# Patient Record
Sex: FEMALE | Race: BLACK OR AFRICAN AMERICAN | NOT HISPANIC OR LATINO | Employment: UNEMPLOYED | URBAN - METROPOLITAN AREA
[De-identification: names, ages, dates, MRNs, and addresses within clinical notes are randomized per-mention and may not be internally consistent; named-entity substitution may affect disease eponyms.]

---

## 2017-07-21 ENCOUNTER — HOSPITAL ENCOUNTER (EMERGENCY)
Facility: HOSPITAL | Age: 37
Discharge: HOME | End: 2017-07-21
Attending: EMERGENCY MEDICINE
Payer: MEDICAID

## 2017-07-21 VITALS
WEIGHT: 180 LBS | BODY MASS INDEX: 33.99 KG/M2 | TEMPERATURE: 98 F | HEART RATE: 77 BPM | HEIGHT: 61 IN | SYSTOLIC BLOOD PRESSURE: 138 MMHG | RESPIRATION RATE: 16 BRPM | DIASTOLIC BLOOD PRESSURE: 89 MMHG | OXYGEN SATURATION: 100 %

## 2017-07-21 DIAGNOSIS — R51.9 NONINTRACTABLE EPISODIC HEADACHE, UNSPECIFIED HEADACHE TYPE: Primary | ICD-10-CM

## 2017-07-21 LAB
B-HCG UR QL: NEGATIVE
CTP QC/QA: YES
DEPRECATED S PYO AG THROAT QL EIA: NEGATIVE

## 2017-07-21 PROCEDURE — 87880 STREP A ASSAY W/OPTIC: CPT

## 2017-07-21 PROCEDURE — 81025 URINE PREGNANCY TEST: CPT | Performed by: EMERGENCY MEDICINE

## 2017-07-21 PROCEDURE — 87081 CULTURE SCREEN ONLY: CPT

## 2017-07-21 PROCEDURE — 99283 EMERGENCY DEPT VISIT LOW MDM: CPT

## 2017-07-21 RX ORDER — BUTALBITAL, ACETAMINOPHEN AND CAFFEINE 50; 325; 40 MG/1; MG/1; MG/1
1 TABLET ORAL EVERY 6 HOURS PRN
Qty: 12 TABLET | Refills: 0 | Status: SHIPPED | OUTPATIENT
Start: 2017-07-21 | End: 2017-08-20

## 2017-07-21 NOTE — DISCHARGE INSTRUCTIONS
You have been given a medication that may cause drowsiness (fioricet), do not drive or operate heavy machinery with this medication. Return to the Emergency department for any worsening or failure to improve, otherwise follow up with your primary care provider.

## 2017-07-21 NOTE — ED TRIAGE NOTES
"Pt reports bumps on her tongue, "was black when I was brushing my teeth this morning", no pain, felt dizzy with HA and double vision  "

## 2017-07-21 NOTE — ED PROVIDER NOTES
Encounter Date: 2017       History     Chief Complaint   Patient presents with    Headache     Occipital headache X 4 days with ocassional dizziness. Also c/o bumps on tongue.     Chief complaint headache  History of present illness: Patient states that 4 days ago she began having occipital headache that is intermittent and throbbing.  She states that nothing alleviates it although she hasn't tried any medications or therapies.  She states that bright lights and loud noises make the headache worse she reports that she is not under an undue amount of stress.  She states the headache does not radiate and timing of the day does not matter to the headache and at present the pain as an 8/10.  Patient also reports that this morning she was brushing her teeth and when she expectorated there were black specks in the expectorant she examined her tongue and found that it was coated in black.  She denies recent antibiotic use.    The history is provided by the patient. No  was used.     Review of patient's allergies indicates:   Allergen Reactions    Ciprofloxacin Hives     Past Medical History:   Diagnosis Date    Calcium deficiency      Past Surgical History:   Procedure Laterality Date    ABDOMINAL SURGERY       SECTION, CLASSIC      x 1     Family History   Problem Relation Age of Onset    Diabetes Father     Hypertension Father     Kidney disease Father     No Known Problems Mother     Heart disease Maternal Grandmother     Diabetes Maternal Grandmother     Diabetes Paternal Grandmother     Heart disease Paternal Grandmother      Social History   Substance Use Topics    Smoking status: Never Smoker    Smokeless tobacco: Never Used    Alcohol use No     Review of Systems   Constitutional: Negative for chills, fatigue and fever.   HENT: Negative for congestion, ear discharge, ear pain, postnasal drip, rhinorrhea, sinus pressure, sneezing, sore throat and voice change.          Black bumps on tongue   Eyes: Negative for discharge and itching.   Respiratory: Negative for cough, shortness of breath and wheezing.    Cardiovascular: Negative for chest pain, palpitations and leg swelling.   Gastrointestinal: Negative for abdominal pain, constipation, diarrhea, nausea and vomiting.   Endocrine: Negative for polydipsia, polyphagia and polyuria.   Genitourinary: Negative for dysuria, frequency, hematuria, urgency, vaginal bleeding, vaginal discharge and vaginal pain.   Musculoskeletal: Negative for arthralgias and myalgias.   Skin: Negative for rash and wound.   Neurological: Positive for headaches. Negative for dizziness, seizures, syncope, weakness and numbness.   Hematological: Negative for adenopathy. Does not bruise/bleed easily.   Psychiatric/Behavioral: Negative for self-injury and suicidal ideas. The patient is not nervous/anxious.        Physical Exam     Initial Vitals [07/21/17 1211]   BP Pulse Resp Temp SpO2   138/89 77 16 98.2 °F (36.8 °C) 100 %      MAP       105.33         Physical Exam    Nursing note and vitals reviewed.  Constitutional: She appears well-developed and well-nourished.   HENT:   Head: Normocephalic and atraumatic.   Right Ear: Hearing, external ear and ear canal normal. Tympanic membrane is erythematous.   Left Ear: Hearing, tympanic membrane, external ear and ear canal normal.   Nose: Nose normal. No epistaxis.   Mouth/Throat: Uvula is midline. Posterior oropharyngeal edema and posterior oropharyngeal erythema present.   Eyes: Conjunctivae and EOM are normal. Pupils are equal, round, and reactive to light. Right eye exhibits no discharge. Left eye exhibits no discharge.   Neck: Normal range of motion.   Cardiovascular: Regular rhythm, S1 normal, S2 normal and normal heart sounds. Exam reveals no gallop.    No murmur heard.  Pulmonary/Chest: Effort normal and breath sounds normal. No respiratory distress. She has no decreased breath sounds. She has no wheezes. She  has no rhonchi. She has no rales.   Abdominal: She exhibits no distension.   Musculoskeletal: Normal range of motion.   Lymphadenopathy:        Head (right side): Submandibular adenopathy present.        Head (left side): Submandibular adenopathy present.   Neurological: She is alert and oriented to person, place, and time. She has normal strength. No cranial nerve deficit or sensory deficit. She exhibits normal muscle tone. She displays a negative Romberg sign. Coordination and gait normal. GCS eye subscore is 4. GCS verbal subscore is 5. GCS motor subscore is 6.   Equal  strength bilaterally, equal bicep flexion and tricep extension strength, leg extension and flexion strength appropriate and equal, foot plantar- and dorsi-flexion equal and appropriate   Skin: Skin is dry. Capillary refill takes less than 2 seconds.         ED Course   Procedures  Labs Reviewed   THROAT SCREEN, RAPID   POCT URINE PREGNANCY             Medical Decision Making:   Initial Assessment:   37-year-old female presents to the emergency department for emergent evaluation of occipital headache as well as black spots on her tongue  Differential Diagnosis:   Tension headache, migraine headache, cluster headache  ED Management:  Following a thorough history and physical, the patient was discharged with Fioricet and urged to follow-up with her primary care provider if this medication therapy does not give complete relief of her symptoms.  A rapid strep test was done in the emergency department and found to be negative a reflex strep swab was sent to the laboratory if the reflex strep is positive she'll be contacted if antibiotics will be prescribed.  Other:   I have discussed this case with another health care provider.       <> Summary of the Discussion: Care of the patient discussed with Dr. Banuelos who agreed with the assessment and plan.                       ED Course     Clinical Impression:   The encounter diagnosis was Nonintractable  episodic headache, unspecified headache type.    Disposition:   Disposition: Discharged  Condition: Stable                        Brandon Moser, [facility]  07/21/17 4037

## 2017-07-23 LAB — BACTERIA THROAT CULT: NORMAL

## 2018-09-12 ENCOUNTER — HOSPITAL ENCOUNTER (EMERGENCY)
Facility: HOSPITAL | Age: 38
Discharge: HOME | End: 2018-09-12
Attending: EMERGENCY MEDICINE
Payer: MEDICAID

## 2018-09-12 VITALS
BODY MASS INDEX: 43.43 KG/M2 | OXYGEN SATURATION: 100 % | WEIGHT: 230 LBS | TEMPERATURE: 98 F | RESPIRATION RATE: 20 BRPM | SYSTOLIC BLOOD PRESSURE: 132 MMHG | HEART RATE: 87 BPM | HEIGHT: 61 IN | DIASTOLIC BLOOD PRESSURE: 78 MMHG

## 2018-09-12 DIAGNOSIS — S39.012A STRAIN OF LUMBAR REGION, INITIAL ENCOUNTER: Primary | ICD-10-CM

## 2018-09-12 DIAGNOSIS — Z34.90 PREGNANCY, UNSPECIFIED GESTATIONAL AGE: ICD-10-CM

## 2018-09-12 LAB
BILIRUB UR QL STRIP: NEGATIVE
CLARITY UR: CLEAR
COLOR UR: YELLOW
GLUCOSE UR QL STRIP: NEGATIVE
HGB UR QL STRIP: NEGATIVE
KETONES UR QL STRIP: NEGATIVE
LEUKOCYTE ESTERASE UR QL STRIP: NEGATIVE
NITRITE UR QL STRIP: NEGATIVE
PH UR STRIP: 7 [PH] (ref 5–8)
PROT UR QL STRIP: NEGATIVE
SP GR UR STRIP: 1.02 (ref 1–1.03)
URN SPEC COLLECT METH UR: NORMAL
UROBILINOGEN UR STRIP-ACNC: NEGATIVE EU/DL

## 2018-09-12 PROCEDURE — 99283 EMERGENCY DEPT VISIT LOW MDM: CPT

## 2018-09-12 PROCEDURE — 87086 URINE CULTURE/COLONY COUNT: CPT

## 2018-09-12 PROCEDURE — 81003 URINALYSIS AUTO W/O SCOPE: CPT

## 2018-09-12 RX ORDER — DEXTROMETHORPHAN HYDROBROMIDE, GUAIFENESIN 5; 100 MG/5ML; MG/5ML
650 LIQUID ORAL EVERY 8 HOURS PRN
Qty: 20 TABLET | Refills: 0 | Status: SHIPPED | OUTPATIENT
Start: 2018-09-12 | End: 2023-08-25

## 2018-09-12 RX ORDER — OXYCODONE AND ACETAMINOPHEN 5; 325 MG/1; MG/1
1 TABLET ORAL
Status: DISCONTINUED | OUTPATIENT
Start: 2018-09-12 | End: 2018-09-12 | Stop reason: HOSPADM

## 2018-09-12 NOTE — ED TRIAGE NOTES
"Pt currently 18 weeks pregnant (and had cervical cerclage placed on the 31st). Pt c/o lower back pain since this AM. Describes as "someone twisting a fist in my back", pain rated 10/10. Denies abdominal pain, N/V, dysuria. Pt took Tylenol 5 hours PTA  "

## 2018-09-12 NOTE — ED PROVIDER NOTES
Encounter Date: 2018    SCRIBE #1 NOTE: I, Gurwinder Davisy II, am scribing for, and in the presence of,  Bhupendra Plummer NP. I have scribed the following portions of the note - Other sections scribed: HPI, ROS.       History     Chief Complaint   Patient presents with    Back Pain     Pt states she is 19 weeks pregnant and has been experiencing lower back pain since she woke up this morning.     CC: Back Pain     HPI: This 38 y.o. Gravid (17 weeks) female presents to the ED c/o acute onset, lumbar back pain x10 hours. Pt reports she woke up with back pain and reports taking Tylenol 500 mg in the morning and at noon. Pt reports her pregnancy is being followed by Brianda Cr. No alleviating factors. Pain is exacerbated with palpation. Pt denies nausea, vomiting, weakness, abdominal pain, and numbness.    PMHx: Calcium Deficiency      The history is provided by the patient. No  was used.     Review of patient's allergies indicates:   Allergen Reactions    Ciprofloxacin Hives     Past Medical History:   Diagnosis Date    Calcium deficiency      Past Surgical History:   Procedure Laterality Date    ABDOMINAL SURGERY      APPENDECTOMY      APPENDECTOMY-LAPAROSCOPIC N/A 2015    Performed by [facility]rell [facility] at [facility] OR     SECTION, CLASSIC      x 1    [id number] N/A 2015    Performed by Cordell Terrell MD at [facility] OR     Family History   Problem Relation Age of Onset    Diabetes Father     Hypertension Father     Kidney disease Father     No Known Problems Mother     Heart disease Maternal Grandmother     Diabetes Maternal Grandmother     Diabetes Paternal Grandmother     Heart disease Paternal Grandmother      Social History     Tobacco Use    Smoking status: Never Smoker    Smokeless tobacco: Never Used   Substance Use Topics    Alcohol use: No    Drug use: No     Review of Systems   Constitutional: Negative for chills, fatigue and fever.    HENT: Negative for congestion, ear pain, nosebleeds, postnasal drip, rhinorrhea, sinus pressure and sore throat.    Eyes: Negative for photophobia and pain.   Respiratory: Negative for apnea, cough, choking, chest tightness, shortness of breath, wheezing and stridor.    Cardiovascular: Negative for chest pain, palpitations and leg swelling.   Gastrointestinal: Negative for abdominal pain, constipation, diarrhea, nausea and vomiting.   Genitourinary: Negative for dysuria, flank pain, hematuria, pelvic pain and vaginal discharge.   Musculoskeletal: Positive for back pain. Negative for arthralgias, gait problem and neck pain.   Skin: Negative for color change, pallor, rash and wound.   Neurological: Negative for dizziness, seizures, syncope, facial asymmetry, light-headedness and headaches.   Hematological: Negative for adenopathy.   Psychiatric/Behavioral: Negative for confusion. The patient is not nervous/anxious.        Physical Exam     Initial Vitals [09/12/18 1818]   BP Pulse Resp Temp SpO2   132/75 86 16 98.6 °F (37 °C) 99 %      MAP       --         Physical Exam    Nursing note and vitals reviewed.  Constitutional: She appears well-developed and well-nourished. She is not diaphoretic. No distress.   HENT:   Head: Normocephalic and atraumatic.   Right Ear: External ear normal.   Left Ear: External ear normal.   Nose: Nose normal.   Eyes: Conjunctivae and EOM are normal. Pupils are equal, round, and reactive to light. Right eye exhibits no discharge. Left eye exhibits no discharge. No scleral icterus.   Neck: Normal range of motion. Neck supple. No thyromegaly present. No tracheal deviation present. No JVD present.   Cardiovascular: Normal rate, regular rhythm, normal heart sounds and intact distal pulses. Exam reveals no gallop and no friction rub.    No murmur heard.  Pulmonary/Chest: Breath sounds normal. No stridor. No respiratory distress. She has no wheezes. She has no rhonchi. She has no rales.    Abdominal: Soft. Bowel sounds are normal. She exhibits no distension and no mass. There is no tenderness. There is no rebound and no guarding.   Musculoskeletal: Normal range of motion. She exhibits no edema.        Lumbar back: She exhibits tenderness and pain.   Midline lumbar pain and tenderness. Pain is exacerbated by lumbar flexion, extension, and other repositioning.  Strength is equal and normal in bilateral lower extremities. No paresthesias.  DP pulses are equal and normal bilaterally.   Lymphadenopathy:     She has no cervical adenopathy.   Neurological: She is alert and oriented to person, place, and time. She has normal strength and normal reflexes. She displays normal reflexes. No cranial nerve deficit or sensory deficit.   Skin: Skin is warm and dry. No rash and no abscess noted. No erythema. No pallor.   Psychiatric: She has a normal mood and affect. Her behavior is normal. Judgment and thought content normal.         ED Course   Procedures  Labs Reviewed   CULTURE, URINE   URINALYSIS          Imaging Results    None          Medical Decision Making:   History:   Old Medical Records: I decided to obtain old medical records.  Differential Diagnosis:   Fracture, dislocation, spinal cord compression, cauda equina syndrome, paraspinal abscess, others  ED Management:  I decided to obtain and review the old medical record.  The is an emergent evaluation for a patient with low back pain. The patient is 17 weeks pregnant.  She denies abdominal pain, vaginal bleeding, dysuria, fever, lower extremity weakness or paresthesias, saddle anesthesia, urinary retention, incontinence, or any additional symptoms.  The patient has no history of major trauma.  The patients age is not greater than 70 or less than 20 years old. I do not suspect an infectious, cancerous, or vascular etiology as the cause of the low back pain. The patient does not have a history of cancer or unexplained weight loss suggesting metastatic  disease.  The patient does not have persistent fevers or night sweats. The patient is not immunocompromised and the patient has not had any chronic steroid use or intravenous drug use.  I do not think this patient has an epidural abscess, osteomyelitis, or metastatic spine lesions.  Pt does not have a history of  aortic aneurysm and I do not think there is evidence of retroperitoneal rupture.  The patient has a normal neurological exam and does not show evidence of cord or root compression.  The patient does not exhibit signs of urinary retention, urinary incontinence, bowel incontinence, or saddle anesthesia.  There is no evidence of cauda equine syndrome.     I do not think emergent radiography with X-rays or CT scan is warranted at this  time. Outpatient imaging can be obtained at the discretion of the Ob/Gyn or primary care Physician.     I will offer this patient symptomatic treatment with acetaminophen, reassurance, and education.      My physical exam findings were reviewed with the patient. Pt agrees with assessment, disposition and treatment plan and has no further questions or complaints at this time.        Case discussed with my attending physician who agreed with the assessment and plan            Scribe Attestation:   Scribe #1: I performed the above scribed service and the documentation accurately describes the services I performed. I attest to the accuracy of the note.    Attending Attestation:     Physician Attestation Statement for NP/PA:   I discussed this assessment and plan of this patient with the NP/PA, but I did not personally examine the patient. The face to face encounter was performed by the NP/PA.        Physician Attestation for Scribe:  Physician Attestation Statement for Scribe #1: I, Bhupendra Plummer NP, reviewed documentation, as scribed by Gurwinder Keith II in my presence, and it is both accurate and complete.                    Clinical Impression:   The primary encounter diagnosis was  Strain of lumbar region, initial encounter. A diagnosis of Pregnancy, unspecified gestational age was also pertinent to this visit.      Disposition:   Disposition: Discharged  Condition: Stable                        Bhupendra Plummer NP  09/12/18 2107       Tico Mcfarland MD  09/12/18 8592

## 2018-09-13 NOTE — DISCHARGE INSTRUCTIONS
Take Tylenol as needed for pain.    Follow-up with your OB/GYN for re-evaluation further management.    Return to the emergency department immediately for any new or worsening symptoms.

## 2018-09-14 LAB — BACTERIA UR CULT: NORMAL

## 2019-10-02 ENCOUNTER — HOSPITAL ENCOUNTER (EMERGENCY)
Facility: HOSPITAL | Age: 39
Discharge: HOME | End: 2019-10-02
Attending: EMERGENCY MEDICINE
Payer: MEDICAID

## 2019-10-02 VITALS
DIASTOLIC BLOOD PRESSURE: 83 MMHG | WEIGHT: 180 LBS | OXYGEN SATURATION: 99 % | RESPIRATION RATE: 18 BRPM | HEART RATE: 71 BPM | HEIGHT: 61 IN | SYSTOLIC BLOOD PRESSURE: 144 MMHG | BODY MASS INDEX: 33.99 KG/M2 | TEMPERATURE: 98 F

## 2019-10-02 DIAGNOSIS — R52 PAIN: ICD-10-CM

## 2019-10-02 DIAGNOSIS — M10.9 PODAGRA: Primary | ICD-10-CM

## 2019-10-02 DIAGNOSIS — R03.0 ELEVATED BLOOD PRESSURE READING: ICD-10-CM

## 2019-10-02 LAB
B-HCG UR QL: NEGATIVE
CTP QC/QA: YES

## 2019-10-02 PROCEDURE — 99283 EMERGENCY DEPT VISIT LOW MDM: CPT | Mod: 25

## 2019-10-02 PROCEDURE — 25000003 PHARM REV CODE 250: Performed by: PHYSICIAN ASSISTANT

## 2019-10-02 PROCEDURE — 81025 URINE PREGNANCY TEST: CPT | Performed by: EMERGENCY MEDICINE

## 2019-10-02 RX ORDER — COLCHICINE 0.6 MG/1
1.2 TABLET, FILM COATED ORAL
Status: COMPLETED | OUTPATIENT
Start: 2019-10-02 | End: 2019-10-02

## 2019-10-02 RX ORDER — MELOXICAM 7.5 MG/1
7.5 TABLET ORAL DAILY
Qty: 15 TABLET | Refills: 0 | Status: SHIPPED | OUTPATIENT
Start: 2019-10-02

## 2019-10-02 RX ORDER — OXYCODONE AND ACETAMINOPHEN 5; 325 MG/1; MG/1
1 TABLET ORAL
Status: COMPLETED | OUTPATIENT
Start: 2019-10-02 | End: 2019-10-02

## 2019-10-02 RX ORDER — COLCHICINE 0.6 MG/1
0.6 TABLET, FILM COATED ORAL
Status: COMPLETED | OUTPATIENT
Start: 2019-10-02 | End: 2019-10-02

## 2019-10-02 RX ADMIN — COLCHICINE 0.6 MG: 0.6 TABLET, FILM COATED ORAL at 10:10

## 2019-10-02 RX ADMIN — OXYCODONE HYDROCHLORIDE AND ACETAMINOPHEN 1 TABLET: 5; 325 TABLET ORAL at 09:10

## 2019-10-02 RX ADMIN — COLCHICINE 1.2 MG: 0.6 TABLET, FILM COATED ORAL at 09:10

## 2019-10-02 NOTE — ED PROVIDER NOTES
Encounter Date: 10/2/2019    SCRIBE #1 NOTE: I, Adrian Marcial, am scribing for, and in the presence of,  Gurjit Walters PA-C. I have scribed the following portions of the note - Other sections scribed: HPI, ROS.       History     Chief Complaint   Patient presents with    Foot Injury     Pt with right foot pain starting on Monday. Denies any injury, trauma or falls. Minor swelling noted to right foot. No obvious deformities noted.      CC: Foot Pain     HPI: This 39 y.o F (LMP 19) with a hx of Calcium deficiency and parathyroid presents to the ED c/o acute onset of constant and severe R great toe pain radiating up the R foot.  She denies injury. She has had no fever, chills, or body aches.  No calf pain or leg swelling. Her mother has a history of gout.  She has not taken any medications for pain.              Review of patient's allergies indicates:   Allergen Reactions    Ciprofloxacin Hives     Past Medical History:   Diagnosis Date    Calcium deficiency      Past Surgical History:   Procedure Laterality Date    ABDOMINAL SURGERY      APPENDECTOMY       SECTION, CLASSIC      x 1     Family History   Problem Relation Age of Onset    Diabetes Father     Hypertension Father     Kidney disease Father     No Known Problems Mother     Heart disease Maternal Grandmother     Diabetes Maternal Grandmother     Diabetes Paternal Grandmother     Heart disease Paternal Grandmother      Social History     Tobacco Use    Smoking status: Never Smoker    Smokeless tobacco: Never Used   Substance Use Topics    Alcohol use: No    Drug use: No     Review of Systems   Constitutional: Negative for chills and fever.   HENT: Negative for sore throat.    Respiratory: Negative for shortness of breath.    Cardiovascular: Negative for chest pain.   Gastrointestinal: Negative for nausea.   Genitourinary: Negative for dysuria.   Musculoskeletal: Positive for arthralgias and joint swelling. Negative for back pain.    Skin: Negative for color change, rash and wound.   Neurological: Negative for weakness and numbness.   Hematological: Does not bruise/bleed easily.       Physical Exam     Initial Vitals [10/02/19 0807]   BP Pulse Resp Temp SpO2   (!) 170/101 84 18 98 °F (36.7 °C) 100 %      MAP       --         Physical Exam    Vitals reviewed.  Constitutional: She appears well-developed and well-nourished. She is not diaphoretic. No distress.   HENT:   Head: Normocephalic and atraumatic.   Right Ear: External ear normal.   Left Ear: External ear normal.   Nose: Nose normal.   Eyes: Conjunctivae are normal. No scleral icterus.   Neck: Normal range of motion. Neck supple.   Cardiovascular: Normal rate, regular rhythm and intact distal pulses.   Pulmonary/Chest: No respiratory distress.   Musculoskeletal: She exhibits edema and tenderness.   Right foot with pain and swelling with warmth to the 1st metatarsal, greatest at the MCP joint.   Neurological: She is alert and oriented to person, place, and time. No sensory deficit.   Skin: Skin is warm and dry. Capillary refill takes less than 2 seconds. No rash and no abscess noted. No erythema. No pallor.   No wounds or skin lesions found on the right foot         ED Course   Procedures  Labs Reviewed   POCT URINE PREGNANCY          Imaging Results          X-Ray Foot Complete Right (Final result)  Result time 10/02/19 10:47:20    Final result by Mukesh Morris MD (10/02/19 10:47:20)                 Impression:      No fracture or dislocation.  Nonspecific soft tissue prominence right 1st MTP joint and 1st digit region.      Electronically signed by: Mukesh Morris MD  Date:    10/02/2019  Time:    10:47             Narrative:    EXAMINATION:  XR FOOT COMPLETE 3 VIEW RIGHT    CLINICAL HISTORY:  . Pain, unspecified    TECHNIQUE:  AP, lateral, and oblique views of the right foot were performed.    COMPARISON:  None    FINDINGS:  Hallux valgus 1st MTP joint with adjacent soft  tissue prominence at 1st digit region.                              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  No fever or body aches.  Patient's mother has history of gout.  No evidence of cellulitis, abscess, or trauma. Very low suspicion at this time for septic arthritis.  I do not think arthrocentesis is necessary right now.  X-ray negative for acute process.  Patient treated with colchicine and Percocet in the ED with improving symptoms. Will discharge with NSAID as needed, strict return precautions regarding fever or worsening pain.  Patient's blood pressure elevated in the ED.  No diagnosis of hypertension.  Will have patient follow up closely for re-evaluation of blood pressure.  All questions answered.            Scribe Attestation:   Scribe #1: I performed the above scribed service and the documentation accurately describes the services I performed. I attest to the accuracy of the note.               Clinical Impression:       ICD-10-CM ICD-9-CM   1. Podagra M10.9 274.01   2. Pain R52 780.96   3. Elevated blood pressure reading R03.0 796.2                                Gurjit Walters PA-C  10/02/19 1100

## 2019-10-02 NOTE — ED TRIAGE NOTES
Pt cc Foot Injury Pt with right foot pain starting on Monday. Denies any injury, trauma or falls. Minor swelling noted to right foot. No obvious deformities noted.

## 2020-02-27 ENCOUNTER — HOSPITAL ENCOUNTER (INPATIENT)
Facility: HOSPITAL | Age: 40
LOS: 3 days | Discharge: HOME | DRG: 644 | End: 2020-03-01
Attending: EMERGENCY MEDICINE | Admitting: INTERNAL MEDICINE
Payer: MEDICAID

## 2020-02-27 DIAGNOSIS — R94.31 QT PROLONGATION: ICD-10-CM

## 2020-02-27 DIAGNOSIS — R29.0 TETANY: ICD-10-CM

## 2020-02-27 DIAGNOSIS — E83.51 HYPOCALCEMIA: Primary | ICD-10-CM

## 2020-02-27 PROBLEM — E83.42 HYPOMAGNESEMIA: Status: ACTIVE | Noted: 2020-02-27

## 2020-02-27 PROBLEM — E03.9 PRIMARY HYPOTHYROIDISM: Status: ACTIVE | Noted: 2020-02-27

## 2020-02-27 PROBLEM — E66.9 OBESITY, CLASS II, BMI 35-39.9: Chronic | Status: ACTIVE | Noted: 2020-02-27

## 2020-02-27 PROBLEM — E20.0 IDIOPATHIC HYPOPARATHYROIDISM: Chronic | Status: ACTIVE | Noted: 2020-02-27

## 2020-02-27 PROBLEM — E66.9 OBESITY, CLASS II, BMI 35-39.9: Status: ACTIVE | Noted: 2020-02-27

## 2020-02-27 PROBLEM — E66.9 OBESITY, CLASS II, BMI 35-39.9: Chronic | Status: RESOLVED | Noted: 2020-02-27 | Resolved: 2020-02-27

## 2020-02-27 PROBLEM — E66.01 MORBID OBESITY WITH BMI OF 40.0-44.9, ADULT: Status: ACTIVE | Noted: 2020-02-27

## 2020-02-27 PROBLEM — E03.9 PRIMARY HYPOTHYROIDISM: Chronic | Status: ACTIVE | Noted: 2020-02-27

## 2020-02-27 PROBLEM — E21.0 PRIMARY HYPERPARATHYROIDISM: Status: ACTIVE | Noted: 2020-02-27

## 2020-02-27 LAB
ALBUMIN SERPL BCP-MCNC: 3.7 G/DL (ref 3.5–5.2)
ALP SERPL-CCNC: 70 U/L (ref 55–135)
ALT SERPL W/O P-5'-P-CCNC: 27 U/L (ref 10–44)
ANION GAP SERPL CALC-SCNC: 13 MMOL/L (ref 8–16)
ANION GAP SERPL CALC-SCNC: 21 MMOL/L (ref 8–16)
AST SERPL-CCNC: 26 U/L (ref 10–40)
B-HCG UR QL: NEGATIVE
BASOPHILS # BLD AUTO: 0.01 K/UL (ref 0–0.2)
BASOPHILS NFR BLD: 0.2 % (ref 0–1.9)
BILIRUB SERPL-MCNC: 0.2 MG/DL (ref 0.1–1)
BILIRUB UR QL STRIP: NEGATIVE
BUN SERPL-MCNC: 8 MG/DL (ref 6–20)
BUN SERPL-MCNC: 8 MG/DL (ref 6–30)
CALCIUM SERPL-MCNC: 5.7 MG/DL (ref 8.7–10.5)
CHLORIDE SERPL-SCNC: 100 MMOL/L (ref 95–110)
CHLORIDE SERPL-SCNC: 94 MMOL/L (ref 95–110)
CK SERPL-CCNC: 855 U/L (ref 20–180)
CLARITY UR: CLEAR
CO2 SERPL-SCNC: 28 MMOL/L (ref 23–29)
COLOR UR: NORMAL
CREAT SERPL-MCNC: 0.7 MG/DL (ref 0.5–1.4)
CREAT SERPL-MCNC: 0.8 MG/DL (ref 0.5–1.4)
CTP QC/QA: YES
DIFFERENTIAL METHOD: ABNORMAL
EOSINOPHIL # BLD AUTO: 0.1 K/UL (ref 0–0.5)
EOSINOPHIL NFR BLD: 1.6 % (ref 0–8)
ERYTHROCYTE [DISTWIDTH] IN BLOOD BY AUTOMATED COUNT: 13.4 % (ref 11.5–14.5)
EST. GFR  (AFRICAN AMERICAN): >60 ML/MIN/1.73 M^2
EST. GFR  (NON AFRICAN AMERICAN): >60 ML/MIN/1.73 M^2
GLUCOSE SERPL-MCNC: 94 MG/DL (ref 70–110)
GLUCOSE SERPL-MCNC: 96 MG/DL (ref 70–110)
GLUCOSE UR QL STRIP: NEGATIVE
HCT VFR BLD AUTO: 41.8 % (ref 37–48.5)
HCT VFR BLD CALC: 41 %PCV (ref 36–54)
HGB BLD-MCNC: 13.9 G/DL (ref 12–16)
HGB UR QL STRIP: NEGATIVE
IMM GRANULOCYTES # BLD AUTO: 0.01 K/UL (ref 0–0.04)
IMM GRANULOCYTES NFR BLD AUTO: 0.2 % (ref 0–0.5)
KETONES UR QL STRIP: NEGATIVE
LEUKOCYTE ESTERASE UR QL STRIP: NEGATIVE
LYMPHOCYTES # BLD AUTO: 3.3 K/UL (ref 1–4.8)
LYMPHOCYTES NFR BLD: 53.1 % (ref 18–48)
MAGNESIUM SERPL-MCNC: 1.4 MG/DL (ref 1.6–2.6)
MCH RBC QN AUTO: 29.2 PG (ref 27–31)
MCHC RBC AUTO-ENTMCNC: 33.3 G/DL (ref 32–36)
MCV RBC AUTO: 88 FL (ref 82–98)
MONOCYTES # BLD AUTO: 0.5 K/UL (ref 0.3–1)
MONOCYTES NFR BLD: 8.5 % (ref 4–15)
NEUTROPHILS # BLD AUTO: 2.2 K/UL (ref 1.8–7.7)
NEUTROPHILS NFR BLD: 36.4 % (ref 38–73)
NITRITE UR QL STRIP: NEGATIVE
NRBC BLD-RTO: 0 /100 WBC
PH UR STRIP: 8 [PH] (ref 5–8)
PHOSPHATE SERPL-MCNC: 4.8 MG/DL (ref 2.7–4.5)
PLATELET # BLD AUTO: 217 K/UL (ref 150–350)
PMV BLD AUTO: 11.1 FL (ref 9.2–12.9)
POC IONIZED CALCIUM: 0.64 MMOL/L (ref 1.06–1.42)
POC TCO2 (MEASURED): 30 MMOL/L (ref 23–29)
POTASSIUM BLD-SCNC: 3.5 MMOL/L (ref 3.5–5.1)
POTASSIUM SERPL-SCNC: 3.6 MMOL/L (ref 3.5–5.1)
PROT SERPL-MCNC: 8.1 G/DL (ref 6–8.4)
PROT UR QL STRIP: NEGATIVE
RBC # BLD AUTO: 4.76 M/UL (ref 4–5.4)
SAMPLE: ABNORMAL
SODIUM BLD-SCNC: 141 MMOL/L (ref 136–145)
SODIUM SERPL-SCNC: 141 MMOL/L (ref 136–145)
SP GR UR STRIP: 1.01 (ref 1–1.03)
URN SPEC COLLECT METH UR: NORMAL
UROBILINOGEN UR STRIP-ACNC: NEGATIVE EU/DL
WBC # BLD AUTO: 6.12 K/UL (ref 3.9–12.7)

## 2020-02-27 PROCEDURE — 63600175 PHARM REV CODE 636 W HCPCS: Performed by: EMERGENCY MEDICINE

## 2020-02-27 PROCEDURE — 81025 URINE PREGNANCY TEST: CPT | Performed by: EMERGENCY MEDICINE

## 2020-02-27 PROCEDURE — 82330 ASSAY OF CALCIUM: CPT

## 2020-02-27 PROCEDURE — 85014 HEMATOCRIT: CPT

## 2020-02-27 PROCEDURE — 96365 THER/PROPH/DIAG IV INF INIT: CPT

## 2020-02-27 PROCEDURE — 82550 ASSAY OF CK (CPK): CPT

## 2020-02-27 PROCEDURE — 83735 ASSAY OF MAGNESIUM: CPT

## 2020-02-27 PROCEDURE — 84100 ASSAY OF PHOSPHORUS: CPT

## 2020-02-27 PROCEDURE — 25000003 PHARM REV CODE 250: Performed by: INTERNAL MEDICINE

## 2020-02-27 PROCEDURE — 21400001 HC TELEMETRY ROOM

## 2020-02-27 PROCEDURE — 63600175 PHARM REV CODE 636 W HCPCS: Performed by: INTERNAL MEDICINE

## 2020-02-27 PROCEDURE — 93005 ELECTROCARDIOGRAM TRACING: CPT

## 2020-02-27 PROCEDURE — 84132 ASSAY OF SERUM POTASSIUM: CPT

## 2020-02-27 PROCEDURE — 80053 COMPREHEN METABOLIC PANEL: CPT

## 2020-02-27 PROCEDURE — 84295 ASSAY OF SERUM SODIUM: CPT

## 2020-02-27 PROCEDURE — 81003 URINALYSIS AUTO W/O SCOPE: CPT

## 2020-02-27 PROCEDURE — 99900035 HC TECH TIME PER 15 MIN (STAT)

## 2020-02-27 PROCEDURE — 25000003 PHARM REV CODE 250: Performed by: EMERGENCY MEDICINE

## 2020-02-27 PROCEDURE — 82565 ASSAY OF CREATININE: CPT

## 2020-02-27 PROCEDURE — 99285 EMERGENCY DEPT VISIT HI MDM: CPT | Mod: 25

## 2020-02-27 PROCEDURE — 85025 COMPLETE CBC W/AUTO DIFF WBC: CPT

## 2020-02-27 RX ORDER — MAGNESIUM SULFATE HEPTAHYDRATE 40 MG/ML
2 INJECTION, SOLUTION INTRAVENOUS ONCE
Status: COMPLETED | OUTPATIENT
Start: 2020-02-27 | End: 2020-02-27

## 2020-02-27 RX ORDER — TALC
9 POWDER (GRAM) TOPICAL NIGHTLY PRN
Status: DISCONTINUED | OUTPATIENT
Start: 2020-02-27 | End: 2020-03-01 | Stop reason: HOSPADM

## 2020-02-27 RX ORDER — ALBUTEROL SULFATE 90 UG/1
1 AEROSOL, METERED RESPIRATORY (INHALATION) EVERY 6 HOURS PRN
Status: CANCELLED | OUTPATIENT
Start: 2020-02-27

## 2020-02-27 RX ORDER — SODIUM CHLORIDE 0.9 % (FLUSH) 0.9 %
10 SYRINGE (ML) INJECTION
Status: CANCELLED | OUTPATIENT
Start: 2020-02-27

## 2020-02-27 RX ORDER — CALCIUM CARBONATE 200(500)MG
500 TABLET,CHEWABLE ORAL 4 TIMES DAILY
Status: COMPLETED | OUTPATIENT
Start: 2020-02-27 | End: 2020-02-29

## 2020-02-27 RX ORDER — SODIUM CHLORIDE 9 MG/ML
INJECTION, SOLUTION INTRAVENOUS CONTINUOUS
Status: DISCONTINUED | OUTPATIENT
Start: 2020-02-27 | End: 2020-02-28

## 2020-02-27 RX ORDER — CALCITRIOL 0.25 UG/1
0.25 CAPSULE ORAL 2 TIMES DAILY
Status: COMPLETED | OUTPATIENT
Start: 2020-02-27 | End: 2020-02-28

## 2020-02-27 RX ORDER — ACETAMINOPHEN 500 MG
500 TABLET ORAL EVERY 6 HOURS PRN
Status: DISCONTINUED | OUTPATIENT
Start: 2020-02-27 | End: 2020-03-01 | Stop reason: HOSPADM

## 2020-02-27 RX ORDER — ONDANSETRON 2 MG/ML
8 INJECTION INTRAMUSCULAR; INTRAVENOUS EVERY 8 HOURS PRN
Status: DISCONTINUED | OUTPATIENT
Start: 2020-02-27 | End: 2020-03-01 | Stop reason: HOSPADM

## 2020-02-27 RX ORDER — AMOXICILLIN 250 MG
1 CAPSULE ORAL 2 TIMES DAILY PRN
Status: DISCONTINUED | OUTPATIENT
Start: 2020-02-27 | End: 2020-03-01 | Stop reason: HOSPADM

## 2020-02-27 RX ORDER — ENOXAPARIN SODIUM 100 MG/ML
40 INJECTION SUBCUTANEOUS EVERY 24 HOURS
Status: DISCONTINUED | OUTPATIENT
Start: 2020-02-27 | End: 2020-03-01 | Stop reason: HOSPADM

## 2020-02-27 RX ORDER — CLONIDINE HYDROCHLORIDE 0.1 MG/1
0.1 TABLET ORAL 3 TIMES DAILY PRN
Status: DISCONTINUED | OUTPATIENT
Start: 2020-02-27 | End: 2020-03-01 | Stop reason: HOSPADM

## 2020-02-27 RX ADMIN — ACETAMINOPHEN 500 MG: 500 TABLET ORAL at 10:02

## 2020-02-27 RX ADMIN — CALCIUM CARBONATE 500 MG: 500 TABLET, CHEWABLE ORAL at 09:02

## 2020-02-27 RX ADMIN — CALCIUM GLUCONATE 1 G: 94 INJECTION, SOLUTION INTRAVENOUS at 06:02

## 2020-02-27 RX ADMIN — SODIUM CHLORIDE 1000 ML: 0.9 INJECTION, SOLUTION INTRAVENOUS at 05:02

## 2020-02-27 RX ADMIN — CALCIUM CARBONATE 500 MG: 500 TABLET, CHEWABLE ORAL at 07:02

## 2020-02-27 RX ADMIN — MAGNESIUM SULFATE 2 G: 2 INJECTION INTRAVENOUS at 09:02

## 2020-02-27 RX ADMIN — CALCITRIOL CAPSULES 0.25 MCG 0.25 MCG: 0.25 CAPSULE ORAL at 07:02

## 2020-02-27 RX ADMIN — SODIUM CHLORIDE: 0.9 INJECTION, SOLUTION INTRAVENOUS at 10:02

## 2020-02-27 RX ADMIN — Medication 9 MG: at 10:02

## 2020-02-28 LAB
ALBUMIN SERPL BCP-MCNC: 3.2 G/DL (ref 3.5–5.2)
ALP SERPL-CCNC: 65 U/L (ref 55–135)
ALT SERPL W/O P-5'-P-CCNC: 22 U/L (ref 10–44)
ANION GAP SERPL CALC-SCNC: 10 MMOL/L (ref 8–16)
AST SERPL-CCNC: 24 U/L (ref 10–40)
BASOPHILS # BLD AUTO: 0.01 K/UL (ref 0–0.2)
BASOPHILS NFR BLD: 0.2 % (ref 0–1.9)
BILIRUB SERPL-MCNC: 0.2 MG/DL (ref 0.1–1)
BUN SERPL-MCNC: 11 MG/DL (ref 6–20)
CA-I BLDV-SCNC: 0.64 MMOL/L (ref 1.06–1.42)
CALCIUM SERPL-MCNC: 5.5 MG/DL (ref 8.7–10.5)
CALCIUM SERPL-MCNC: 6.1 MG/DL (ref 8.7–10.5)
CHLORIDE SERPL-SCNC: 102 MMOL/L (ref 95–110)
CO2 SERPL-SCNC: 26 MMOL/L (ref 23–29)
CREAT SERPL-MCNC: 0.8 MG/DL (ref 0.5–1.4)
DIFFERENTIAL METHOD: NORMAL
EOSINOPHIL # BLD AUTO: 0.1 K/UL (ref 0–0.5)
EOSINOPHIL NFR BLD: 1.5 % (ref 0–8)
ERYTHROCYTE [DISTWIDTH] IN BLOOD BY AUTOMATED COUNT: 13.5 % (ref 11.5–14.5)
EST. GFR  (AFRICAN AMERICAN): >60 ML/MIN/1.73 M^2
EST. GFR  (NON AFRICAN AMERICAN): >60 ML/MIN/1.73 M^2
GLUCOSE SERPL-MCNC: 102 MG/DL (ref 70–110)
HCT VFR BLD AUTO: 40.2 % (ref 37–48.5)
HGB BLD-MCNC: 13 G/DL (ref 12–16)
IMM GRANULOCYTES # BLD AUTO: 0.02 K/UL (ref 0–0.04)
IMM GRANULOCYTES NFR BLD AUTO: 0.4 % (ref 0–0.5)
LYMPHOCYTES # BLD AUTO: 2.5 K/UL (ref 1–4.8)
LYMPHOCYTES NFR BLD: 46.3 % (ref 18–48)
MAGNESIUM SERPL-MCNC: 1.7 MG/DL (ref 1.6–2.6)
MCH RBC QN AUTO: 28.9 PG (ref 27–31)
MCHC RBC AUTO-ENTMCNC: 32.3 G/DL (ref 32–36)
MCV RBC AUTO: 89 FL (ref 82–98)
MONOCYTES # BLD AUTO: 0.5 K/UL (ref 0.3–1)
MONOCYTES NFR BLD: 8.8 % (ref 4–15)
NEUTROPHILS # BLD AUTO: 2.3 K/UL (ref 1.8–7.7)
NEUTROPHILS NFR BLD: 42.8 % (ref 38–73)
NRBC BLD-RTO: 0 /100 WBC
PHOSPHATE SERPL-MCNC: 5.6 MG/DL (ref 2.7–4.5)
PLATELET # BLD AUTO: 217 K/UL (ref 150–350)
PMV BLD AUTO: 11 FL (ref 9.2–12.9)
POTASSIUM SERPL-SCNC: 3.3 MMOL/L (ref 3.5–5.1)
PROT SERPL-MCNC: 6.9 G/DL (ref 6–8.4)
PTH-INTACT SERPL-MCNC: <5 PG/ML (ref 9–77)
RBC # BLD AUTO: 4.5 M/UL (ref 4–5.4)
SODIUM SERPL-SCNC: 138 MMOL/L (ref 136–145)
WBC # BLD AUTO: 5.34 K/UL (ref 3.9–12.7)

## 2020-02-28 PROCEDURE — 63600175 PHARM REV CODE 636 W HCPCS: Performed by: EMERGENCY MEDICINE

## 2020-02-28 PROCEDURE — 85025 COMPLETE CBC W/AUTO DIFF WBC: CPT

## 2020-02-28 PROCEDURE — 83735 ASSAY OF MAGNESIUM: CPT

## 2020-02-28 PROCEDURE — 25000003 PHARM REV CODE 250: Performed by: INTERNAL MEDICINE

## 2020-02-28 PROCEDURE — 82310 ASSAY OF CALCIUM: CPT

## 2020-02-28 PROCEDURE — 83970 ASSAY OF PARATHORMONE: CPT

## 2020-02-28 PROCEDURE — 25000003 PHARM REV CODE 250: Performed by: EMERGENCY MEDICINE

## 2020-02-28 PROCEDURE — 21400001 HC TELEMETRY ROOM

## 2020-02-28 PROCEDURE — 82330 ASSAY OF CALCIUM: CPT

## 2020-02-28 PROCEDURE — 36415 COLL VENOUS BLD VENIPUNCTURE: CPT

## 2020-02-28 PROCEDURE — 63600175 PHARM REV CODE 636 W HCPCS: Performed by: INTERNAL MEDICINE

## 2020-02-28 PROCEDURE — 84100 ASSAY OF PHOSPHORUS: CPT

## 2020-02-28 PROCEDURE — 80053 COMPREHEN METABOLIC PANEL: CPT

## 2020-02-28 RX ORDER — MAGNESIUM SULFATE HEPTAHYDRATE 40 MG/ML
2 INJECTION, SOLUTION INTRAVENOUS ONCE
Status: COMPLETED | OUTPATIENT
Start: 2020-02-28 | End: 2020-02-28

## 2020-02-28 RX ORDER — POTASSIUM CHLORIDE 20 MEQ/1
40 TABLET, EXTENDED RELEASE ORAL ONCE
Status: COMPLETED | OUTPATIENT
Start: 2020-02-28 | End: 2020-02-28

## 2020-02-28 RX ORDER — CALCIUM GLUCONATE 98 MG/ML
2 INJECTION, SOLUTION INTRAVENOUS 3 TIMES DAILY
Status: DISCONTINUED | OUTPATIENT
Start: 2020-02-28 | End: 2020-02-28

## 2020-02-28 RX ADMIN — CALCIUM CARBONATE 500 MG: 500 TABLET, CHEWABLE ORAL at 04:02

## 2020-02-28 RX ADMIN — CALCIUM CARBONATE 500 MG: 500 TABLET, CHEWABLE ORAL at 08:02

## 2020-02-28 RX ADMIN — CALCIUM GLUCONATE 2000 MG: 98 INJECTION, SOLUTION INTRAVENOUS at 04:02

## 2020-02-28 RX ADMIN — CALCITRIOL CAPSULES 0.25 MCG 0.25 MCG: 0.25 CAPSULE ORAL at 09:02

## 2020-02-28 RX ADMIN — MAGNESIUM SULFATE 2 G: 2 INJECTION INTRAVENOUS at 10:02

## 2020-02-28 RX ADMIN — CALCIUM GLUCONATE 2000 MG: 98 INJECTION, SOLUTION INTRAVENOUS at 08:02

## 2020-02-28 RX ADMIN — Medication 9 MG: at 08:02

## 2020-02-28 RX ADMIN — CALCIUM GLUCONATE 2000 MG: 98 INJECTION, SOLUTION INTRAVENOUS at 09:02

## 2020-02-28 RX ADMIN — CLONIDINE HYDROCHLORIDE 0.1 MG: 0.1 TABLET ORAL at 08:02

## 2020-02-28 RX ADMIN — CALCIUM CARBONATE 500 MG: 500 TABLET, CHEWABLE ORAL at 09:02

## 2020-02-28 RX ADMIN — CALCIUM CARBONATE 500 MG: 500 TABLET, CHEWABLE ORAL at 01:02

## 2020-02-28 RX ADMIN — POTASSIUM CHLORIDE 40 MEQ: 1500 TABLET, EXTENDED RELEASE ORAL at 11:02

## 2020-02-28 NOTE — ASSESSMENT & PLAN NOTE
Patient has a history of hyperparathyroidism which she has been admitted several times in the past for hypocalcemia.  Her serum calcium level today is 5.7 mg/dL with a normal albumin of 3.7 g/dL.  Ionized calcium is low at 0.64 mmol/L. Of note, she does have a low magnesium level of 1.4 mg/dL which will be repleted.  I personally reviewed the EKG which was significant for normal sinus rhythm with prolonged QT-c interval of 471 msec.  She has been started on calcitriol and calcium carbonate with improvement of her symptoms.  Likely etiology is a combination of hypomagnesemia and not taking her medications for the past two months.  She will be monitored on cardiac telemetry with continued repletion of her calcium.  Will repeat her calcium level in the morning and consult Endocrinology for further recommendations.

## 2020-02-28 NOTE — ASSESSMENT & PLAN NOTE
S/P repetion, follow up calcium level is at 6.1   Complete the three dose of IV Calcium gluconate today   Repeat in the AM, check the ionized calcium as well  Encouraged not to runout of her home calcium/Calcitriol again,   Long term adverse effects of repeated low calcium level brought in patient's attention.

## 2020-02-28 NOTE — HOSPITAL COURSE
2/28/2020  No overnight events  Doing well this AM     2/29/2020  Stable this   Calcium not documented  Ordered for this evening.

## 2020-02-28 NOTE — PROGRESS NOTES
[facility] Hospital Medicine  Progress Note    Patient Name: Callie Glover  MRN: [id number]  Patient Class: IP- Inpatient   Admission Date: 2/27/2020  Length of Stay: 1 days  Attending Physician: Gopal Roy MD  Primary Care Provider: Andrés Whiting Jr, MD        Subjective:     Principal Problem:Hypocalcemia        HPI:  Ms. Callie Glover is a 40 y.o. female with primary hypothyroidism and morbid obesity (BMI 41.4) who presents to [facility] ED with complaints of and cramps today.  She reports similar symptoms in the past which were due to hypocalcemia reports that, although both hands cramp, her left hand is usually worse.  She was driving today when she notices attempt decided to present to the ED for further evaluation.  For the past week she has been having intermittent symptoms of hand cramping and perioral paresthesias the typically resolve without intervention.  She denies any fevers, chills, nausea, vomiting, chest pain, shortness of breath, palpitations, diaphoresis, abdominal pain, nor any diarrhea.  She does report being off her medications for the past two months.    Of note, she was diagnosed with hypoparathyroidism in 2003 and was told that her parathyroid to not make an of hormone.  She denies any neck surgeries.    Overview/Hospital Course:  2/28/2019   No overnight events  Doing well this AM         Interval History: pt reported improved tetany and muscle twichting after initial Caclum gluconate administration       Review of Systems   Constitutional: Negative.    Neurological: Negative.      Objective:     Vital Signs (Most Recent):  Temp: 97.8 °F (36.6 °C) (02/28/20 1515)  Pulse: 67 (02/28/20 1515)  Resp: 18 (02/28/20 1515)  BP: 123/66 (02/28/20 1515)  SpO2: (!) 94 % (02/28/20 1515) Vital Signs (24h Range):  Temp:  [97.7 °F (36.5 °C)-98.7 °F (37.1 °C)] 97.8 °F (36.6 °C)  Pulse:  [] 67  Resp:  [17-23] 18  SpO2:  [94 %-100 %] 94 %  BP: (120-183)/(65-91) 123/66      Weight: 99.4 kg (219 lb 2.2 oz)  Body mass index is 41.41 kg/m².    Intake/Output Summary (Last 24 hours) at 2/28/2020 1538  Last data filed at 2/28/2020 1224  Gross per 24 hour   Intake 1460 ml   Output --   Net 1460 ml      Physical Exam   Constitutional: She is oriented to person, place, and time. She appears well-developed and well-nourished.   HENT:   Head: Normocephalic.   Mouth/Throat: Oropharynx is clear and moist.   Eyes: Pupils are equal, round, and reactive to light. Conjunctivae are normal. Right eye exhibits no discharge. Left eye exhibits no discharge. No scleral icterus.   Neck: Normal range of motion. Neck supple. No JVD present. No thyromegaly present.   Cardiovascular: Normal rate, normal heart sounds and intact distal pulses. Exam reveals no gallop.   No murmur heard.  Pulmonary/Chest: Effort normal and breath sounds normal.   Abdominal: Soft. Bowel sounds are normal.   Lymphadenopathy:     She has no cervical adenopathy.   Neurological: She is alert and oriented to person, place, and time. She displays normal reflexes. No cranial nerve deficit or sensory deficit.   Skin: Skin is warm and dry. Capillary refill takes less than 2 seconds. No rash noted. No erythema.   Psychiatric: She has a normal mood and affect. Judgment normal.   Nursing note and vitals reviewed.      Significant Labs:   BMP:   Recent Labs   Lab 02/28/20  0350 02/28/20  1259     --      --    K 3.3*  --      --    CO2 26  --    BUN 11  --    CREATININE 0.8  --    CALCIUM 5.5* 6.1*   MG 1.7  --      CBC:   Recent Labs   Lab 02/27/20  1745 02/27/20  1747 02/28/20  0350   WBC 6.12  --  5.34   HGB 13.9  --  13.0   HCT 41.8 41 40.2     --  217       Significant Imaging: I have reviewed all pertinent imaging results/findings within the past 24 hours.      Assessment/Plan:      * Hypocalcemia  S/P repetion, follow up calcium level is at 6.1   Complete the three dose of IV Calcium gluconate today   Repeat in  the AM, check the ionized calcium as well  Encouraged not to runout of her home calcium/Calcitriol again,   Long term adverse effects of repeated low calcium level brought in patient's attention.       Morbid obesity with BMI of 40.0-44.9, adult  The patient has been counseled on the negative impact that obesity imparts on her health and was encouraged to make lifestyle changes in order to lose weight and decrease her modifiable risk factors.    Idiopathic hypoparathyroidism  -Low parathyroid hormone level  -Replete calcium and magnesium as discussed below.   -Ordered 2 g of Calcium gluconate every8 hours   -Completed 1 g of Calcilum gluconate already  -Apppreciate the staff nocturnist.       Hypomagnesemia  Likely contributing to his hypocalcemia.  Will replete intravenously and recheck her magnesium level in the morning.      VTE Risk Mitigation (From admission, onward)         Ordered     enoxaparin injection 40 mg  Daily      02/27/20 2158     IP VTE HIGH RISK PATIENT  Once      02/27/20 2158                      Gopal Roy MD  Department of Hospital Medicine   [facility]

## 2020-02-28 NOTE — ASSESSMENT & PLAN NOTE
-Low parathyroid hormone level  -Replete calcium and magnesium as discussed below.   -Ordered 2 g of Calcium gluconate every8 hours   -Completed 1 g of Calcilum gluconate already  -Apppreciate the staff nocturnist.

## 2020-02-28 NOTE — HPI
Ms. Callie Glover is a 40 y.o. female with primary hypothyroidism and morbid obesity (BMI 41.4) who presents to [facility] ED with complaints of and cramps today.  She reports similar symptoms in the past which were due to hypocalcemia reports that, although both hands cramp, her left hand is usually worse.  She was driving today when she notices attempt decided to present to the ED for further evaluation.  For the past week she has been having intermittent symptoms of hand cramping and perioral paresthesias the typically resolve without intervention.  She denies any fevers, chills, nausea, vomiting, chest pain, shortness of breath, palpitations, diaphoresis, abdominal pain, nor any diarrhea.  She does report being off her medications for the past two months.    Of note, she was diagnosed with hypoparathyroidism in 2003 and was told that her parathyroid to not make an of hormone.  She denies any neck surgeries.

## 2020-02-28 NOTE — ASSESSMENT & PLAN NOTE
Likely contributing to his hypocalcemia.  Will replete intravenously and recheck her magnesium level in the morning.

## 2020-02-28 NOTE — H&P
[facility] Hospital Medicine  History & Physical    Patient Name: Callie Glover  MRN: [id number]  Admission Date: 2020  Attending Physician: Gopal Roy MD   Primary Care Provider: Andrés Whiting Jr, MD         Patient information was obtained from patient.     Subjective:     Principal Problem:Hypocalcemia    Chief Complaint:  Hand cramping today.    HPI: Ms. Callie Glover is a 40 y.o. female with primary hypothyroidism and morbid obesity (BMI 41.4) who presents to [facility] ED with complaints of and cramps today.  She reports similar symptoms in the past which were due to hypocalcemia reports that, although both hands cramp, her left hand is usually worse.  She was driving today when she notices attempt decided to present to the ED for further evaluation.  For the past week she has been having intermittent symptoms of hand cramping and perioral paresthesias the typically resolve without intervention.  She denies any fevers, chills, nausea, vomiting, chest pain, shortness of breath, palpitations, diaphoresis, abdominal pain, nor any diarrhea.  She does report being off her medications for the past two months.    Of note, she was diagnosed with hypoparathyroidism in  and was told that her parathyroid to not make an of hormone.  She denies any neck surgeries.    Chart Review:  Previous Hospitalizations  Date Hospital Diagnosis   Aug 2015 [facility] Acute appendicitis s/p laparoscopic appendectomy   Dec 2013 [facility] Symptomatic hypoglycemia   May 2011 [facility] Symptomatic hypocalcemia     Past Medical History:   Diagnosis Date    Calcium deficiency        Past Surgical History:   Procedure Laterality Date    ABDOMINAL SURGERY      APPENDECTOMY       SECTION, CLASSIC      x 1       Review of patient's allergies indicates:   Allergen Reactions    Ciprofloxacin Hives       No current facility-administered medications on file prior to encounter.      Current Outpatient Medications on  File Prior to Encounter   Medication Sig    calcium citrate (CALCITRATE) 200 mg (950 mg) tablet Take 9 tablets by mouth once daily.    acetaminophen (TYLENOL) 650 MG TbSR Take 1 tablet (650 mg total) by mouth every 8 (eight) hours as needed.    albuterol 90 mcg/actuation inhaler Inhale 1-2 puffs into the lungs every 6 (six) hours as needed for Wheezing.    meloxicam (MOBIC) 7.5 MG tablet Take 1 tablet (7.5 mg total) by mouth once daily.     Family History     Problem Relation (Age of Onset)    Diabetes Father, Maternal Grandmother, Paternal Grandmother    Heart disease Maternal Grandmother, Paternal Grandmother    Hypertension Father    Kidney disease Father    No Known Problems Mother        Tobacco Use    Smoking status: Never Smoker    Smokeless tobacco: Never Used   Substance and Sexual Activity    Alcohol use: No    Drug use: No    Sexual activity: Yes     Partners: Male     Birth control/protection: OCP     Review of Systems   Constitutional: Negative for activity change, appetite change, chills, diaphoresis, fatigue, fever and unexpected weight change.   HENT: Negative.    Eyes: Negative.    Respiratory: Negative for cough, chest tightness, shortness of breath and wheezing.    Cardiovascular: Negative for chest pain, palpitations and leg swelling.   Gastrointestinal: Negative for abdominal distention, abdominal pain, blood in stool, constipation, diarrhea, nausea and vomiting.   Genitourinary: Negative for dysuria and hematuria.   Musculoskeletal: Negative.    Skin: Negative.    Neurological: Positive for numbness (Perioral). Negative for dizziness, seizures, syncope, weakness, light-headedness and headaches.        Bilateral hand cramping   Psychiatric/Behavioral: Negative.      Objective:     Vital Signs (Most Recent):  Temp: 97.7 °F (36.5 °C) (02/27/20 2110)  Pulse: 74 (02/27/20 2110)  Resp: 18 (02/27/20 2110)  BP: 138/79 (02/27/20 2110)  SpO2: 98 % (02/27/20 2110) Vital Signs (24h Range):  Temp:   [97.7 °F (36.5 °C)-98.7 °F (37.1 °C)] 97.7 °F (36.5 °C)  Pulse:  [] 74  Resp:  [17-23] 18  SpO2:  [98 %-100 %] 98 %  BP: (138-183)/(66-91) 138/79     Weight: 99.4 kg (219 lb 2.2 oz)  Body mass index is 41.41 kg/m².    Physical Exam   Constitutional: She is oriented to person, place, and time. She appears well-developed and well-nourished. No distress.   Morbidly obese   HENT:   Head: Normocephalic and atraumatic.   Right Ear: External ear normal.   Left Ear: External ear normal.   Nose: Nose normal.   Eyes: Right eye exhibits no discharge. Left eye exhibits no discharge.   Neck: Normal range of motion.   Cardiovascular: Normal rate, regular rhythm, normal heart sounds and intact distal pulses. Exam reveals no gallop and no friction rub.   No murmur heard.  Pulmonary/Chest: Effort normal and breath sounds normal. No stridor. No respiratory distress. She has no wheezes. She has no rales. She exhibits no tenderness.   Abdominal: Soft. Bowel sounds are normal. She exhibits no distension. There is no tenderness. There is no rebound and no guarding.   Musculoskeletal: Normal range of motion. She exhibits no edema.   Neurological: She is alert and oriented to person, place, and time.   Skin: Skin is warm and dry. She is not diaphoretic. No erythema.   Psychiatric: She has a normal mood and affect. Her behavior is normal. Judgment and thought content normal.   Nursing note and vitals reviewed.          Significant Labs: All pertinent labs within the past 24 hours have been reviewed.    Significant Imaging: I have reviewed and interpreted all pertinent imaging results/findings within the past 24 hours.    Assessment/Plan:     * Hypocalcemia  Patient has a history of hyperparathyroidism which she has been admitted several times in the past for hypocalcemia.  Her serum calcium level today is 5.7 mg/dL with a normal albumin of 3.7 g/dL.  Ionized calcium is low at 0.64 mmol/L. Of note, she does have a low magnesium level  of 1.4 mg/dL which will be repleted.  I personally reviewed the EKG which was significant for normal sinus rhythm with prolonged QT-c interval of 471 msec.  She has been started on calcitriol and calcium carbonate with improvement of her symptoms.  Likely etiology is a combination of hypomagnesemia and not taking her medications for the past two months.  She will be monitored on cardiac telemetry with continued repletion of her calcium.  Will repeat her calcium level in the morning and consult Endocrinology for further recommendations.    Hypomagnesemia  Likely contributing to his hypocalcemia.  Will replete intravenously and recheck her magnesium level in the morning.    Idiopathic hypoparathyroidism  As addressed above.    Morbid obesity with BMI of 40.0-44.9, adult  The patient has been counseled on the negative impact that obesity imparts on her health and was encouraged to make lifestyle changes in order to lose weight and decrease her modifiable risk factors.    VTE Risk Mitigation (From admission, onward)         Ordered     enoxaparin injection 40 mg  Daily      02/27/20 2158     IP VTE HIGH RISK PATIENT  Once      02/27/20 2158                   The patient will be placed in inpatient status.          Talya Ricci M.D.  Staff Nocturnist  Department of Hospital Medicine  [facility]  Pager: [phone]          N.B.: Portions of this note was dictated using M*Modal Fluency Direct--there may be voice recognition errors occasionally missed on review.

## 2020-02-28 NOTE — ED PROVIDER NOTES
"Encounter Date: 2020       History     Chief Complaint   Patient presents with    Hypocalcaemia     pt states "i have hypocalcemia." states x5 days of "hands locking up." pain noted to bilateral hands.      Patient presenting with painful hand cramping and a past medical history of hypocalcemia, secondary to primary hypo-parathyroidism, having run out of her medication 2 months ago.  She states that she is followed by endocrinologist at [facility], but does not have a followup for several months.  She reports several days of worsening hand cramping as well as perioral numbness and tingling which is consistent with previous episodes of low calcium.  She denies any palpitations, chest pain or near syncope or syncope.    The history is provided by the patient and medical records.     Review of patient's allergies indicates:   Allergen Reactions    Ciprofloxacin Hives     Past Medical History:   Diagnosis Date    Calcium deficiency     Obesity, Class II, BMI 35-39.9 2020     Past Surgical History:   Procedure Laterality Date    ABDOMINAL SURGERY      APPENDECTOMY       SECTION, CLASSIC      x 1     Family History   Problem Relation Age of Onset    Diabetes Father     Hypertension Father     Kidney disease Father     No Known Problems Mother     Heart disease Maternal Grandmother     Diabetes Maternal Grandmother     Diabetes Paternal Grandmother     Heart disease Paternal Grandmother      Social History     Tobacco Use    Smoking status: Never Smoker    Smokeless tobacco: Never Used   Substance Use Topics    Alcohol use: No    Drug use: No     Review of Systems   Constitutional: Negative for chills and fever.   HENT: Negative for facial swelling.    Eyes: Negative for redness.   Respiratory: Negative for shortness of breath.    Cardiovascular: Negative for chest pain.   Gastrointestinal: Negative for vomiting.   Genitourinary: Negative for dysuria.   Musculoskeletal: Positive for myalgias. " Negative for gait problem.   Skin: Negative for pallor.   Neurological: Negative for facial asymmetry and weakness.   Psychiatric/Behavioral: Negative for confusion.   All other systems reviewed and are negative.      Physical Exam     Initial Vitals [02/27/20 1714]   BP Pulse Resp Temp SpO2   (!) 183/86 90 17 98.7 °F (37.1 °C) 99 %      MAP       --         Physical Exam    Nursing note and vitals reviewed.  Constitutional: She is not diaphoretic. No distress.   Initially uncomfortable, tearful  female with her  massaging her left hand which is actively cramping   HENT:   Head: Normocephalic.   Eyes: EOM are normal.   Neck: Normal range of motion.   Cardiovascular: Normal rate.   Pulmonary/Chest: No respiratory distress.   Abdominal: Soft. She exhibits no distension.   Musculoskeletal: Normal range of motion.        Hands:  Neurological: She is alert and oriented to person, place, and time.   Skin: Skin is warm and dry.   Psychiatric: She has a normal mood and affect.         ED Course   Procedures  Labs Reviewed   CBC W/ AUTO DIFFERENTIAL - Abnormal; Notable for the following components:       Result Value    Gran% 36.4 (*)     Lymph% 53.1 (*)     All other components within normal limits   COMPREHENSIVE METABOLIC PANEL - Abnormal; Notable for the following components:    Calcium 5.7 (*)     All other components within normal limits    Narrative:       CA critical result(s) called and verbal readback obtained from   Denia Clement @2373 by RICARDA 02/27/2020 18:41   CK - Abnormal; Notable for the following components:     (*)     All other components within normal limits   MAGNESIUM - Abnormal; Notable for the following components:    Magnesium 1.4 (*)     All other components within normal limits   PHOSPHORUS - Abnormal; Notable for the following components:    Phosphorus 4.8 (*)     All other components within normal limits   ISTAT PROCEDURE - Abnormal; Notable for the following  components:    POC Chloride 94 (*)     POC TCO2 (MEASURED) 30 (*)     POC Anion Gap 21 (*)     POC Ionized Calcium 0.64 (*)     All other components within normal limits   URINALYSIS   PTH, INTACT   POCT URINE PREGNANCY     EKG Readings: (Independently Interpreted)   Initial Reading: No STEMI. Other Findings: Prolonged QT Interval.   Patient with mildly prolonged QTC of 471     ECG Results          EKG 12-lead (Final result)  Result time 02/29/20 14:39:45    Final result by Interface, Lab In [facility] (02/29/20 14:39:45)                 Narrative:    Test Reason : E83.51,    Vent. Rate : 087 BPM     Atrial Rate : 087 BPM     P-R Int : 112 ms          QRS Dur : 074 ms      QT Int : 392 ms       P-R-T Axes : 064 069 -43 degrees     QTc Int : 471 ms    Normal sinus rhythm  T wave abnormality, consider inferolateral ischemia  Prolonged QT  Abnormal ECG  When compared with ECG of 04-AUG-2015 20:09,  Significant changes have occurred  Confirmed by Julio Elizabeth MD ([id number]) on 2/29/2020 2:39:36 PM    Referred By: ALLEN   SELF           Confirmed By:Julio Elizabeth MD                            Imaging Results    None          Medical Decision Making:   History:   I obtained history from: someone other than patient.  Initial Assessment:   Patient presenting with hand cramping, perioral swelling and history of hypocalcemia, off all meds for the past month.  Differential Diagnosis:   Severe hypocalcemia, rhabdomyolysis, other electrolyte abnormality, concurrent hyper or hypophosphatemia, muscle cramping NOS.  ED Management:  Patient presenting with a history of hypocalcemia and active hand tetany for muscle cramping.  Stat bedside ionized calcium was significant for severe hypocalcemia.  Stat 1 g of calcium gluconate was given in the ED as well as IV fluids and the patient has significant symptom improvement of her tetany.  However she has severe hypocalcemia on i-STAT with an ionized calcium of 0.64 and a total calcium on  BMP of 5.7.  Given the severity of her hypocalcemia, the patient will require cardiac monitoring and gentle rehydration repletion of her hypocalcemia as an inpatient.  Will consult her for admission.    Dakota Grey MD,   Emergency Medicine  02/27/2020 7:09 PM    (This note was written with voice recognition software.  Please excuse any grammatical errors.)               Attending Attestation:         Attending Critical Care:   Critical Care Times:   Direct Patient Care (initial evaluation, reassessments, and time considering the case)................................................................5 minutes.   Ordering, Reviewing, and Interpreting Diagnostic Studies...............................................................................................................5 minutes.   Documentation..................................................................................................................................................................................5 minutes.   Consultation with other Physicians. .................................................................................................................................................5 minutes.   ==============================================================  · Total Critical Care Time - exclusive of procedural time: 20 minutes.  ==============================================================  Critical care was necessary to treat or prevent imminent or life-threatening deterioration of the following conditions: metabolic crisis.   Critical care was time spent personally by me on the following activities: discussion with consultants, ordering lab, x-rays, and/or EKG, development of treatment plan with patient or relative and re-evaluation of patient's conition.   Critical Care Condition: potentially life-threatening               ED Course as of Mar 10 1154   Thu Feb 27, 2020   1737 No significant ST elevation.  T-wave inversions and  V4 through V6 and inferior leads which appears new compared to previous on August 4, 2015.   EKG 12-lead [CH]   1907 POC Ionized Calcium(!!): 0.64 [CH]   1907 Calcium(!!): 5.7 [CH]      ED Course User Index  [CH] Dakota Grey MD                Clinical Impression:       ICD-10-CM ICD-9-CM   1. Hypocalcemia E83.51 275.41   2. QT prolongation R94.31 794.31   3. Tetany R29.0 781.7             ED Disposition Condition    Admit                           Dakota Grey MD  02/27/20 1911       Dakota Grey MD  03/10/20 1152       Dakota Grey MD  03/10/20 1154

## 2020-02-28 NOTE — CONSULTS
Reason for consult : Hypocalcemia       HPI : Ms Glover is a 40 yr old woman with  hypocalcemia secondary to hypoparathyroidism was admitted on 20 with severe muscle cramps and perioral paraesthesia for almost 10 days .  The records show intact PTH level < 5.0 in  and  .   On arrival her corrected calcium levels was 6.3 mg/dl . Her ionized calcium was very low :0.64 . EKG showed QT prolongation. She has not been on her calcitriol for more than a month .   She is currently started on IV calcium 2 gm Q 8 hrs. EKG showing . The magnesium level were low. On replacement,.   Denied any neck surgeries.   As OP she was on calcium 600 mg ( 3 tabs tid ) , calcitriol and vitamin D and mag oxide.   Denied of any autoimmune disease in past including hypothyroidism and diabetes .     Past Medical History:   Diagnosis Date    Calcium deficiency     Obesity, Class II, BMI 35-39.9 2020     Past Surgical History:   Procedure Laterality Date    ABDOMINAL SURGERY      APPENDECTOMY       SECTION, CLASSIC      x 1     Social History     Socioeconomic History    Marital status: Single     Spouse name: Not on file    Number of children: Not on file    Years of education: Not on file    Highest education level: Not on file   Occupational History    Not on file   Social Needs    Financial resource strain: Not on file    Food insecurity:     Worry: Not on file     Inability: Not on file    Transportation needs:     Medical: Not on file     Non-medical: Not on file   Tobacco Use    Smoking status: Never Smoker    Smokeless tobacco: Never Used   Substance and Sexual Activity    Alcohol use: No    Drug use: No    Sexual activity: Yes     Partners: Male     Birth control/protection: OCP   Lifestyle    Physical activity:     Days per week: Not on file     Minutes per session: Not on file    Stress: Not on file   Relationships    Social connections:     Talks on phone: Not on file     Gets together:  Not on file     Attends [employer] service: Not on file     Active member of club or organization: Not on file     Attends meetings of clubs or organizations: Not on file     Relationship status: Not on file   Other Topics Concern    Not on file   Social History Narrative    Not on file     No current facility-administered medications on file prior to encounter.      Current Outpatient Medications on File Prior to Encounter   Medication Sig Dispense Refill    calcium citrate (CALCITRATE) 200 mg (950 mg) tablet Take 9 tablets by mouth once daily.      acetaminophen (TYLENOL) 650 MG TbSR Take 1 tablet (650 mg total) by mouth every 8 (eight) hours as needed. 20 tablet 0    albuterol 90 mcg/actuation inhaler Inhale 1-2 puffs into the lungs every 6 (six) hours as needed for Wheezing. 1 Inhaler 0    meloxicam (MOBIC) 7.5 MG tablet Take 1 tablet (7.5 mg total) by mouth once daily. 15 tablet 0     Review of patient's allergies indicates:   Allergen Reactions    Ciprofloxacin Hives     Family History   Problem Relation Age of Onset    Diabetes Father     Hypertension Father     Kidney disease Father     No Known Problems Mother     Heart disease Maternal Grandmother     Diabetes Maternal Grandmother     Diabetes Paternal Grandmother     Heart disease Paternal Grandmother        Review of Systems    Constitutional: Negative for activity change, appetite change, fatigue and unexpected weight change.   HENT: Negative for congestion.   Eyes: Negative for discharge and visual disturbance.  Respiratory: Negative for cough, chest tightness and shortness of breath.    Cardiovascular: Negative for chest pain, palpitations and leg swelling.   Gastrointestinal: Negative for abdominal distention.   Endocrine: Negative for cold intolerance, heat intolerance, polydipsia, polyphagia and polyuria.   Genitourinary: Negative for frequency and decreased urine volume.  Neurological: Negative for dizziness, tremors, speech  difficulty, weakness, light-headedness,  ++ numbness and + muscle cramps , tingling and edison-oral numbness.   Hematological: Negative for adenopathy.  Psychiatric/Behavioral: Negative for agitation.     Physical Exam     Vitals:    02/28/20 1113   BP: 121/70   Pulse: 66   Resp: 18   Temp: 98.2 °F (36.8 °C)     Constitutional:  oriented to person, place, and time. No distress. HENT: Head: Normocephalic and atraumatic.   Right Ear:External ear normal.  Left Ear: External ear normal. Eyes: Conjunctivae are normal. Pupils are equal, round, and reactive to light.  Neck: Normal range of motion. Neck supple. No thyromegaly present. No nodules palpable.  Cardiovascular: Normal rate and regular rhythm.   Pulmonary/Chest: Effort normal and breath sounds normal.   Abdominal: Soft. Bowel sounds are normal. Non distended and non teder.    Musculoskeletal: Normal range of motion.   Neurological: alert and oriented to person, place, and time.  Skin: Skin is warm and dry.   Psychiatric:  has a normal mood and affect.     Labs :   Results for NIC HAMLIN (MRN [id number]) as of 2/28/2020 12:45   Ref. Range 8/4/2015 20:47 8/5/2015 03:34 2/27/2020 17:45 2/28/2020 03:50   Glucose Latest Ref Range: 70 - 110 mg/dL 89 97 94 102   Calcium Latest Ref Range: 8.7 - 10.5 mg/dL 6.8 (LL) 6.4 (LL) 5.7 (LL) 5.5 (LL)   Phosphorus Latest Ref Range: 2.7 - 4.5 mg/dL   4.8 (H) 5.6 (H)   Magnesium Latest Ref Range: 1.6 - 2.6 mg/dL   1.4 (L) 1.7       Assessment and Plan     Ms Hamlin is a 40 yr old woman with Idiopathic hypoparathyroidism ( Less likely autoimmune as she has no other autoimmune disease ) with hypocalcemia  was admitted with symptomatic hypocalcemia. Patient was not taking calcitriol for more than a month.     Plan :   - Agree with the IV infusion of calcium gluconate q 8 hrs.   - Goal to keep calcium close to the lower limit of the normal range to avoid hypercalciuria.   - At the time of discharge , patient to continue on calcium 1000  mg tid , Calcitriol 0.25 mg daily , Vitamin D 2000 units daily and Magnesium Oxide 400 mg daily. Tums as needed if she gets symptomatic.   - Pending labs: calcium and ionized calcium     Thank you for involving me in the care of this patient.      Ratna Dyer M.D.  Endocrinology     [address], [address]  [place] [address]  [phone] (Ofice)  [phone] (Fax        -

## 2020-02-28 NOTE — SUBJECTIVE & OBJECTIVE
Past Medical History:   Diagnosis Date    Calcium deficiency        Past Surgical History:   Procedure Laterality Date    ABDOMINAL SURGERY      APPENDECTOMY       SECTION, CLASSIC      x 1       Review of patient's allergies indicates:   Allergen Reactions    Ciprofloxacin Hives       No current facility-administered medications on file prior to encounter.      Current Outpatient Medications on File Prior to Encounter   Medication Sig    calcium citrate (CALCITRATE) 200 mg (950 mg) tablet Take 9 tablets by mouth once daily.    acetaminophen (TYLENOL) 650 MG TbSR Take 1 tablet (650 mg total) by mouth every 8 (eight) hours as needed.    albuterol 90 mcg/actuation inhaler Inhale 1-2 puffs into the lungs every 6 (six) hours as needed for Wheezing.    meloxicam (MOBIC) 7.5 MG tablet Take 1 tablet (7.5 mg total) by mouth once daily.     Family History     Problem Relation (Age of Onset)    Diabetes Father, Maternal Grandmother, Paternal Grandmother    Heart disease Maternal Grandmother, Paternal Grandmother    Hypertension Father    Kidney disease Father    No Known Problems Mother        Tobacco Use    Smoking status: Never Smoker    Smokeless tobacco: Never Used   Substance and Sexual Activity    Alcohol use: No    Drug use: No    Sexual activity: Yes     Partners: Male     Birth control/protection: OCP     Review of Systems   Constitutional: Negative for activity change, appetite change, chills, diaphoresis, fatigue, fever and unexpected weight change.   HENT: Negative.    Eyes: Negative.    Respiratory: Negative for cough, chest tightness, shortness of breath and wheezing.    Cardiovascular: Negative for chest pain, palpitations and leg swelling.   Gastrointestinal: Negative for abdominal distention, abdominal pain, blood in stool, constipation, diarrhea, nausea and vomiting.   Genitourinary: Negative for dysuria and hematuria.   Musculoskeletal: Negative.    Skin: Negative.    Neurological:  Positive for numbness (Perioral). Negative for dizziness, seizures, syncope, weakness, light-headedness and headaches.        Bilateral hand cramping   Psychiatric/Behavioral: Negative.      Objective:     Vital Signs (Most Recent):  Temp: 97.7 °F (36.5 °C) (02/27/20 2110)  Pulse: 74 (02/27/20 2110)  Resp: 18 (02/27/20 2110)  BP: 138/79 (02/27/20 2110)  SpO2: 98 % (02/27/20 2110) Vital Signs (24h Range):  Temp:  [97.7 °F (36.5 °C)-98.7 °F (37.1 °C)] 97.7 °F (36.5 °C)  Pulse:  [] 74  Resp:  [17-23] 18  SpO2:  [98 %-100 %] 98 %  BP: (138-183)/(66-91) 138/79     Weight: 99.4 kg (219 lb 2.2 oz)  Body mass index is 41.41 kg/m².    Physical Exam   Constitutional: She is oriented to person, place, and time. She appears well-developed and well-nourished. No distress.   Morbidly obese   HENT:   Head: Normocephalic and atraumatic.   Right Ear: External ear normal.   Left Ear: External ear normal.   Nose: Nose normal.   Eyes: Right eye exhibits no discharge. Left eye exhibits no discharge.   Neck: Normal range of motion.   Cardiovascular: Normal rate, regular rhythm, normal heart sounds and intact distal pulses. Exam reveals no gallop and no friction rub.   No murmur heard.  Pulmonary/Chest: Effort normal and breath sounds normal. No stridor. No respiratory distress. She has no wheezes. She has no rales. She exhibits no tenderness.   Abdominal: Soft. Bowel sounds are normal. She exhibits no distension. There is no tenderness. There is no rebound and no guarding.   Musculoskeletal: Normal range of motion. She exhibits no edema.   Neurological: She is alert and oriented to person, place, and time.   Skin: Skin is warm and dry. She is not diaphoretic. No erythema.   Psychiatric: She has a normal mood and affect. Her behavior is normal. Judgment and thought content normal.   Nursing note and vitals reviewed.          Significant Labs: All pertinent labs within the past 24 hours have been reviewed.    Significant Imaging: I  have reviewed and interpreted all pertinent imaging results/findings within the past 24 hours.

## 2020-02-28 NOTE — SUBJECTIVE & OBJECTIVE
Interval History: pt reported improved tetany and muscle twichting after initial Caclum gluconate administration       Review of Systems   Constitutional: Negative.    Neurological: Negative.      Objective:     Vital Signs (Most Recent):  Temp: 97.8 °F (36.6 °C) (02/28/20 1515)  Pulse: 67 (02/28/20 1515)  Resp: 18 (02/28/20 1515)  BP: 123/66 (02/28/20 1515)  SpO2: (!) 94 % (02/28/20 1515) Vital Signs (24h Range):  Temp:  [97.7 °F (36.5 °C)-98.7 °F (37.1 °C)] 97.8 °F (36.6 °C)  Pulse:  [] 67  Resp:  [17-23] 18  SpO2:  [94 %-100 %] 94 %  BP: (120-183)/(65-91) 123/66     Weight: 99.4 kg (219 lb 2.2 oz)  Body mass index is 41.41 kg/m².    Intake/Output Summary (Last 24 hours) at 2/28/2020 1538  Last data filed at 2/28/2020 1224  Gross per 24 hour   Intake 1460 ml   Output --   Net 1460 ml      Physical Exam   Constitutional: She is oriented to person, place, and time. She appears well-developed and well-nourished.   HENT:   Head: Normocephalic.   Mouth/Throat: Oropharynx is clear and moist.   Eyes: Pupils are equal, round, and reactive to light. Conjunctivae are normal. Right eye exhibits no discharge. Left eye exhibits no discharge. No scleral icterus.   Neck: Normal range of motion. Neck supple. No JVD present. No thyromegaly present.   Cardiovascular: Normal rate, normal heart sounds and intact distal pulses. Exam reveals no gallop.   No murmur heard.  Pulmonary/Chest: Effort normal and breath sounds normal.   Abdominal: Soft. Bowel sounds are normal.   Lymphadenopathy:     She has no cervical adenopathy.   Neurological: She is alert and oriented to person, place, and time. She displays normal reflexes. No cranial nerve deficit or sensory deficit.   Skin: Skin is warm and dry. Capillary refill takes less than 2 seconds. No rash noted. No erythema.   Psychiatric: She has a normal mood and affect. Judgment normal.   Nursing note and vitals reviewed.      Significant Labs:   BMP:   Recent Labs   Lab  02/28/20  0350 02/28/20  1259     --      --    K 3.3*  --      --    CO2 26  --    BUN 11  --    CREATININE 0.8  --    CALCIUM 5.5* 6.1*   MG 1.7  --      CBC:   Recent Labs   Lab 02/27/20  1745 02/27/20  1747 02/28/20  0350   WBC 6.12  --  5.34   HGB 13.9  --  13.0   HCT 41.8 41 40.2     --  217       Significant Imaging: I have reviewed all pertinent imaging results/findings within the past 24 hours.

## 2020-02-29 LAB
ANION GAP SERPL CALC-SCNC: 10 MMOL/L (ref 8–16)
BUN SERPL-MCNC: 12 MG/DL (ref 6–20)
CALCIUM SERPL-MCNC: 7.9 MG/DL (ref 8.7–10.5)
CALCIUM SERPL-MCNC: 7.9 MG/DL (ref 8.7–10.5)
CHLORIDE SERPL-SCNC: 101 MMOL/L (ref 95–110)
CO2 SERPL-SCNC: 29 MMOL/L (ref 23–29)
CREAT SERPL-MCNC: 0.8 MG/DL (ref 0.5–1.4)
EST. GFR  (AFRICAN AMERICAN): >60 ML/MIN/1.73 M^2
EST. GFR  (NON AFRICAN AMERICAN): >60 ML/MIN/1.73 M^2
GLUCOSE SERPL-MCNC: 87 MG/DL (ref 70–110)
POTASSIUM SERPL-SCNC: 4 MMOL/L (ref 3.5–5.1)
SODIUM SERPL-SCNC: 140 MMOL/L (ref 136–145)

## 2020-02-29 PROCEDURE — 63600175 PHARM REV CODE 636 W HCPCS: Performed by: INTERNAL MEDICINE

## 2020-02-29 PROCEDURE — 25000003 PHARM REV CODE 250: Performed by: EMERGENCY MEDICINE

## 2020-02-29 PROCEDURE — 94761 N-INVAS EAR/PLS OXIMETRY MLT: CPT

## 2020-02-29 PROCEDURE — 21400001 HC TELEMETRY ROOM

## 2020-02-29 PROCEDURE — 36415 COLL VENOUS BLD VENIPUNCTURE: CPT

## 2020-02-29 PROCEDURE — 80048 BASIC METABOLIC PNL TOTAL CA: CPT

## 2020-02-29 RX ADMIN — CALCIUM CARBONATE 500 MG: 500 TABLET, CHEWABLE ORAL at 02:02

## 2020-02-29 RX ADMIN — CALCIUM GLUCONATE 2000 MG: 98 INJECTION, SOLUTION INTRAVENOUS at 11:02

## 2020-02-29 RX ADMIN — CALCIUM GLUCONATE 2000 MG: 98 INJECTION, SOLUTION INTRAVENOUS at 02:02

## 2020-02-29 RX ADMIN — CALCIUM CARBONATE 500 MG: 500 TABLET, CHEWABLE ORAL at 08:02

## 2020-02-29 NOTE — PROGRESS NOTES
[facility] Hospital Medicine  Progress Note    Patient Name: Callie Glover  MRN: [id number]  Patient Class: IP- Inpatient   Admission Date: 2/27/2020  Length of Stay: 2 days  Attending Physician: Gopal Roy MD  Primary Care Provider: Maria De Jesus Darnell MD        Subjective:     Principal Problem:Hypocalcemia        HPI:  Ms. Callie Glover is a 40 y.o. female with primary hypothyroidism and morbid obesity (BMI 41.4) who presents to [facility] ED with complaints of and cramps today.  She reports similar symptoms in the past which were due to hypocalcemia reports that, although both hands cramp, her left hand is usually worse.  She was driving today when she notices attempt decided to present to the ED for further evaluation.  For the past week she has been having intermittent symptoms of hand cramping and perioral paresthesias the typically resolve without intervention.  She denies any fevers, chills, nausea, vomiting, chest pain, shortness of breath, palpitations, diaphoresis, abdominal pain, nor any diarrhea.  She does report being off her medications for the past two months.    Of note, she was diagnosed with hypoparathyroidism in 2003 and was told that her parathyroid to not make an of hormone.  She denies any neck surgeries.    Overview/Hospital Course:  2/28/2020  No overnight events  Doing well this AM     2/29/2020  Stable this   Calcium not documented  Ordered for this evening.       Interval History: pt reported improved tetany and muscle twichting after initial Caclum gluconate administration       Review of Systems   Constitutional: Negative.    Neurological: Negative.      Objective:     Vital Signs (Most Recent):  Temp: 98.3 °F (36.8 °C) (02/29/20 1714)  Pulse: 77 (02/29/20 1714)  Resp: 19 (02/29/20 1714)  BP: (!) 155/72 (02/29/20 1714)  SpO2: 97 % (02/29/20 1714) Vital Signs (24h Range):  Temp:  [97.6 °F (36.4 °C)-98.4 °F (36.9 °C)] 98.3 °F (36.8 °C)  Pulse:  [60-77] 77  Resp:  [18-20]  19  SpO2:  [97 %-100 %] 97 %  BP: (116-181)/(65-96) 155/72     Weight: 99 kg (218 lb 4.1 oz)  Body mass index is 41.24 kg/m².    Intake/Output Summary (Last 24 hours) at 2/29/2020 1752  Last data filed at 2/29/2020 0607  Gross per 24 hour   Intake 360 ml   Output --   Net 360 ml      Physical Exam   Constitutional: She is oriented to person, place, and time. She appears well-developed and well-nourished.   HENT:   Head: Normocephalic.   Mouth/Throat: Oropharynx is clear and moist.   Eyes: Pupils are equal, round, and reactive to light. Conjunctivae are normal. Right eye exhibits no discharge. Left eye exhibits no discharge. No scleral icterus.   Neck: Normal range of motion. Neck supple. No JVD present. No thyromegaly present.   Cardiovascular: Normal rate, normal heart sounds and intact distal pulses. Exam reveals no gallop.   No murmur heard.  Pulmonary/Chest: Effort normal and breath sounds normal.   Abdominal: Soft. Bowel sounds are normal.   Lymphadenopathy:     She has no cervical adenopathy.   Neurological: She is alert and oriented to person, place, and time. She displays normal reflexes. No cranial nerve deficit or sensory deficit.   Skin: Skin is warm and dry. Capillary refill takes less than 2 seconds. No rash noted. No erythema.   Psychiatric: She has a normal mood and affect. Judgment normal.   Nursing note and vitals reviewed.      Significant Labs:   BMP:   Recent Labs   Lab 02/28/20  0350 02/28/20  1259     --      --    K 3.3*  --      --    CO2 26  --    BUN 11  --    CREATININE 0.8  --    CALCIUM 5.5* 6.1*   MG 1.7  --      CBC:   Recent Labs   Lab 02/28/20  0350   WBC 5.34   HGB 13.0   HCT 40.2          Significant Imaging: I have reviewed all pertinent imaging results/findings within the past 24 hours.      Assessment/Plan:      * Hypocalcemia  S/P repetion, follow up calcium level is at 6.1   Complete the three dose of IV Calcium gluconate today   Repeat in the AM,  check the ionized calcium as well  Encouraged not to runout of her home calcium/Calcitriol again,   Long term adverse effects of repeated low calcium level brought in patient's attention.       Morbid obesity with BMI of 40.0-44.9, adult  The patient has been counseled on the negative impact that obesity imparts on her health and was encouraged to make lifestyle changes in order to lose weight and decrease her modifiable risk factors.    Idiopathic hypoparathyroidism  -Low parathyroid hormone level  -Replete calcium and magnesium as discussed below.   -Ordered 2 g of Calcium gluconate every8 hours   -Completed 1 g of Calcilum gluconate already    2/29/2020  Continue IV Calcium Gluconate  Continue to monitor closely.   Follow up on the calcium level for today     Hypomagnesemia  Likely contributing to his hypocalcemia.  Will replete intravenously and recheck her magnesium level in the morning.      VTE Risk Mitigation (From admission, onward)         Ordered     enoxaparin injection 40 mg  Daily      02/27/20 2158     IP VTE HIGH RISK PATIENT  Once      02/27/20 2158                      Gopal Roy MD  Department of Hospital Medicine   [facility]

## 2020-02-29 NOTE — PLAN OF CARE
02/29/20 1406   Discharge Assessment   Assessment Type Discharge Planning Assessment   Confirmed/corrected address and phone number on facesheet? Yes   Assessment information obtained from? Patient   Communicated expected length of stay with patient/caregiver yes;no   Prior to hospitilization cognitive status: Alert/Oriented   Prior to hospitalization functional status: Independent   Current cognitive status: Alert/Oriented   Current Functional Status: Independent   Facility Arrived From: Home   Lives With child(aubrey), dependent   Able to Return to Prior Arrangements yes   Is patient able to care for self after discharge? Yes   Who are your caregiver(s) and their phone number(s)? Paddy-significant other: [phone]   Patient's perception of discharge disposition home or selfcare   Readmission Within the Last 30 Days no previous admission in last 30 days   Patient currently being followed by outpatient case management? No   Patient currently receives any other outside agency services? No   Equipment Currently Used at Home none   Do you have any problems affording any of your prescribed medications? No   Is the patient taking medications as prescribed? yes   Does the patient have transportation home? Yes   Transportation Anticipated car, drives self;family or friend will provide   Does the patient receive services at the [facility]? No   Discharge Plan A Home   Discharge Plan B Other  (TBD)   DME Needed Upon Discharge  other (see comments)  (TBD)   Patient/Family in Agreement with Plan yes   SW Role explained to patient; two patient identifiers recognized; SW contact information placed on Communication board. Discussed patient managing health care at home; determined who would be helping patient at home with recovery: Paddy-significant other and mother are help at home.    PC[facility]     Extended Emergency Contact Information  Primary Emergency Contact:  Paddy Saldana   [facility]  Home Phone: [phone]  Relation: Significant other     Prescription Pad  KEN Cruz [id number]    Payor: MEDICAID / Plan: [facility] COMMUNITY PLAN [facility] ([place] MEDICAID) / Product Type: Managed Medicaid /

## 2020-02-29 NOTE — SUBJECTIVE & OBJECTIVE
Interval History: pt reported improved tetany and muscle twichting after initial Caclum gluconate administration       Review of Systems   Constitutional: Negative.    Neurological: Negative.      Objective:     Vital Signs (Most Recent):  Temp: 98.3 °F (36.8 °C) (02/29/20 1714)  Pulse: 77 (02/29/20 1714)  Resp: 19 (02/29/20 1714)  BP: (!) 155/72 (02/29/20 1714)  SpO2: 97 % (02/29/20 1714) Vital Signs (24h Range):  Temp:  [97.6 °F (36.4 °C)-98.4 °F (36.9 °C)] 98.3 °F (36.8 °C)  Pulse:  [60-77] 77  Resp:  [18-20] 19  SpO2:  [97 %-100 %] 97 %  BP: (116-181)/(65-96) 155/72     Weight: 99 kg (218 lb 4.1 oz)  Body mass index is 41.24 kg/m².    Intake/Output Summary (Last 24 hours) at 2/29/2020 1752  Last data filed at 2/29/2020 0607  Gross per 24 hour   Intake 360 ml   Output --   Net 360 ml      Physical Exam   Constitutional: She is oriented to person, place, and time. She appears well-developed and well-nourished.   HENT:   Head: Normocephalic.   Mouth/Throat: Oropharynx is clear and moist.   Eyes: Pupils are equal, round, and reactive to light. Conjunctivae are normal. Right eye exhibits no discharge. Left eye exhibits no discharge. No scleral icterus.   Neck: Normal range of motion. Neck supple. No JVD present. No thyromegaly present.   Cardiovascular: Normal rate, normal heart sounds and intact distal pulses. Exam reveals no gallop.   No murmur heard.  Pulmonary/Chest: Effort normal and breath sounds normal.   Abdominal: Soft. Bowel sounds are normal.   Lymphadenopathy:     She has no cervical adenopathy.   Neurological: She is alert and oriented to person, place, and time. She displays normal reflexes. No cranial nerve deficit or sensory deficit.   Skin: Skin is warm and dry. Capillary refill takes less than 2 seconds. No rash noted. No erythema.   Psychiatric: She has a normal mood and affect. Judgment normal.   Nursing note and vitals reviewed.      Significant Labs:   BMP:   Recent Labs   Lab 02/28/20  4389  02/28/20  1259     --      --    K 3.3*  --      --    CO2 26  --    BUN 11  --    CREATININE 0.8  --    CALCIUM 5.5* 6.1*   MG 1.7  --      CBC:   Recent Labs   Lab 02/28/20  0350   WBC 5.34   HGB 13.0   HCT 40.2          Significant Imaging: I have reviewed all pertinent imaging results/findings within the past 24 hours.

## 2020-02-29 NOTE — ASSESSMENT & PLAN NOTE
-Low parathyroid hormone level  -Replete calcium and magnesium as discussed below.   -Ordered 2 g of Calcium gluconate every8 hours   -Completed 1 g of Calcilum gluconate already    2/29/2020  Continue IV Calcium Gluconate  Continue to monitor closely.   Follow up on the calcium level for today

## 2020-02-29 NOTE — NURSING
End of shift bedside report given to DAVID Fountain. Patient in no apparent distress.     12 hour chart check complete.

## 2020-03-01 VITALS
HEART RATE: 76 BPM | RESPIRATION RATE: 19 BRPM | HEIGHT: 61 IN | OXYGEN SATURATION: 98 % | BODY MASS INDEX: 41.21 KG/M2 | SYSTOLIC BLOOD PRESSURE: 154 MMHG | DIASTOLIC BLOOD PRESSURE: 87 MMHG | TEMPERATURE: 98 F | WEIGHT: 218.25 LBS

## 2020-03-01 LAB
ANION GAP SERPL CALC-SCNC: 9 MMOL/L (ref 8–16)
BUN SERPL-MCNC: 14 MG/DL (ref 6–20)
CALCIUM SERPL-MCNC: 7.4 MG/DL (ref 8.7–10.5)
CHLORIDE SERPL-SCNC: 102 MMOL/L (ref 95–110)
CO2 SERPL-SCNC: 27 MMOL/L (ref 23–29)
CREAT SERPL-MCNC: 0.7 MG/DL (ref 0.5–1.4)
EST. GFR  (AFRICAN AMERICAN): >60 ML/MIN/1.73 M^2
EST. GFR  (NON AFRICAN AMERICAN): >60 ML/MIN/1.73 M^2
GLUCOSE SERPL-MCNC: 104 MG/DL (ref 70–110)
POTASSIUM SERPL-SCNC: 4.1 MMOL/L (ref 3.5–5.1)
SODIUM SERPL-SCNC: 138 MMOL/L (ref 136–145)

## 2020-03-01 PROCEDURE — 80048 BASIC METABOLIC PNL TOTAL CA: CPT

## 2020-03-01 PROCEDURE — 36415 COLL VENOUS BLD VENIPUNCTURE: CPT

## 2020-03-01 RX ORDER — AMOXICILLIN 250 MG
1 CAPSULE ORAL 2 TIMES DAILY PRN
Qty: 7 TABLET | Refills: 0 | Status: SHIPPED | OUTPATIENT
Start: 2020-03-01 | End: 2020-03-08

## 2020-03-01 RX ORDER — VIT C/E/ZN/COPPR/LUTEIN/ZEAXAN 250MG-90MG
2000 CAPSULE ORAL DAILY
Qty: 60 CAPSULE | Refills: 1 | Status: SHIPPED | OUTPATIENT
Start: 2020-03-01 | End: 2020-03-31

## 2020-03-01 RX ORDER — IBUPROFEN 200 MG
5 CAPSULE ORAL 3 TIMES DAILY
Qty: 450 TABLET | Refills: 0 | Status: SHIPPED | OUTPATIENT
Start: 2020-03-01 | End: 2020-03-31

## 2020-03-01 RX ORDER — LANOLIN ALCOHOL/MO/W.PET/CERES
400 CREAM (GRAM) TOPICAL DAILY
Qty: 30 TABLET | Refills: 2 | Status: SHIPPED | OUTPATIENT
Start: 2020-03-01 | End: 2020-03-31

## 2020-03-01 RX ORDER — CALCITRIOL 0.25 UG/1
0.25 CAPSULE ORAL DAILY
Qty: 30 CAPSULE | Refills: 2 | Status: SHIPPED | OUTPATIENT
Start: 2020-03-01 | End: 2020-03-31

## 2020-03-01 NOTE — PLAN OF CARE
03/01/20 1211   Final Note   Assessment Type Final Discharge Note   Anticipated Discharge Disposition Home   What phone number can be called within the next 1-3 days to see how you are doing after discharge?   ([phone] )   Hospital Follow Up  Appt(s) scheduled? No  (Unable to schedule)   Discharge plans and expectations educations in teach back method with documentation complete? Yes   Right Care Referral Info   Post Acute Recommendation No Care   Post-Acute Status   Post-Acute Authorization Other  (Home)   Other Status No Post-Acute Service Needs   Discharge Delays None known at this time

## 2020-03-01 NOTE — PLAN OF CARE
03/01/20 1210   Post-Acute Status   Post-Acute Authorization   (Home; patient will schedule follow-up)   Discharge Delays None known at this time   Discharge Plan   Discharge Plan A Home with family   Discharge Plan B Home with family

## 2020-03-01 NOTE — NURSING
Bedside report received from NANCY Pop. Patient is awake and alert. Patient appears to be in no apparent distress. Safety maintained. Will continue to monitor.

## 2020-03-01 NOTE — DISCHARGE SUMMARY
[facility] Hospital Medicine  Discharge Summary      Patient Name: Callie Glover  MRN: [id number]  Admission Date: 2/27/2020  Hospital Length of Stay: 3 days  Discharge Date and Time: 3/1/2020 12:16 PM  Attending Physician: Lucia att. providers found   Discharging Provider: Gopal Roy MD  Primary Care Provider: Maria De Jesus Darnell MD    Chief Complaints:   Hand cramping, and tetany     HPI: Ms. Callie Glover is a 40 y.o. female with primary hypothyroidism and morbid obesity (BMI 41.4) who presents to [facility] ED with complaints of and cramps today.  She reports similar symptoms in the past which were due to hypocalcemia reports that, although both hands cramp, her left hand is usually worse.  She was driving today when she notices attempt decided to present to the ED for further evaluation.  For the past week she has been having intermittent symptoms of hand cramping and perioral paresthesias the typically resolve without intervention.  She denies any fevers, chills, nausea, vomiting, chest pain, shortness of breath, palpitations, diaphoresis, abdominal pain, nor any diarrhea.  She does report being off her medications for the past two months.     Of note, she was diagnosed with hypoparathyroidism in 2003 and was told that her parathyroid to not make an of hormone.  She denies any neck surgeries.    * No surgery found *      Hospital Course:   The patient was admitted for treatment of Tetany in the setting of severe hypocalcemia in the setting of hypoparathyroidism, in the setting of her running out of her calcium citrate pills and refills.      After initial IV Calcium gluconate 1 g, her calcium and ionized calcium continued to be low. The patient was then started on IV Calcium gluconate 2 g IV q8 hrS, calcium citrate oral, and magnesium oxide. The patient's symptoms improved significantly. Her calcium level also improved to baseline of above 7.   Endocrinology also saw the patient who agreed to the  above treatment plan. Endo also recommended to discharge the patient with Calcium citrate 1000 mg every 8 hours, Calcitriol 0.25 mg daily , Tiffanie D 2000 Units and MagOxide 400 mg TID.   The patient was to be discharged home today with follow up in her endocrine clinic in one week post discharged.       Consults:   Consults (From admission, onward)        Status Ordering Provider     Inpatient consult to Endocrinology  Once     Provider:  MD Berta Meza CANH M          Final Active Diagnoses:    Diagnosis Date Noted POA    PRINCIPAL PROBLEM:  Hypocalcemia [E83.51] 02/27/2020 Yes    Hypomagnesemia [E83.42] 02/27/2020 Yes    Idiopathic hypoparathyroidism [E20.0] 02/27/2020 Yes     Chronic    Morbid obesity with BMI of 40.0-44.9, adult [E66.01, Z68.41] 02/27/2020 Not Applicable      Problems Resolved During this Admission:      Discharged Condition: stable    Disposition: Home or Self Care    Follow Up:    Patient Instructions:      Ambulatory referral/consult to Endocrinology   Standing Status: Future   Referral Priority: Routine Referral Type: Consultation   Requested Specialty: Endocrinology   Number of Visits Requested: 1     Diet Adult Regular     Notify your health care provider if you experience any of the following:  temperature >100.4     Notify your health care provider if you experience any of the following:  persistent dizziness, light-headedness, or visual disturbances     Notify your health care provider if you experience any of the following:  difficulty breathing or increased cough     Activity as tolerated     Medications:  Reconciled Home Medications:      Medication List      START taking these medications    calcitRIOL 0.25 MCG Cap  Commonly known as:  ROCALTROL  Take 1 capsule (0.25 mcg total) by mouth once daily.     cholecalciferol (vitamin D3) 25 mcg (1,000 unit) capsule  Commonly known as:  VITAMIN D3  Take 2 capsules (2,000 Units total) by mouth once daily.      magnesium oxide 400 mg (241.3 mg magnesium) tablet  Commonly known as:  MAG-OX  Take 1 tablet (400 mg total) by mouth once daily.     senna-docusate 8.6-50 mg 8.6-50 mg per tablet  Commonly known as:  PERICOLACE  Take 1 tablet by mouth 2 (two) times daily as needed for Constipation.        CHANGE how you take these medications    calcium citrate 200 mg (950 mg) tablet  Commonly known as:  CALCITRATE  Take 5 tablets (1,000 mg total) by mouth 3 (three) times daily.  What changed:    · how much to take  · when to take this        CONTINUE taking these medications    acetaminophen 650 MG Tbsr  Commonly known as:  TYLENOL  Take 1 tablet (650 mg total) by mouth every 8 (eight) hours as needed.     albuterol 90 mcg/actuation inhaler  Commonly known as:  PROVENTIL/VENTOLIN HFA  Inhale 1-2 puffs into the lungs every 6 (six) hours as needed for Wheezing.     meloxicam 7.5 MG tablet  Commonly known as:  MOBIC  Take 1 tablet (7.5 mg total) by mouth once daily.            Significant Diagnostic Studies: Labs:   CMP   Recent Labs   Lab 02/29/20  1809 03/01/20  0449    138   K 4.0 4.1    102   CO2 29 27   GLU 87 104   BUN 12 14   CREATININE 0.8 0.7   CALCIUM 7.9*  7.9* 7.4*   ANIONGAP 10 9   ESTGFRAFRICA >60 >60   EGFRNONAA >60 >60    and CBC No results for input(s): WBC, HGB, HCT, PLT in the last 48 hours.    Pending Diagnostic Studies:     None        Indwelling Lines/Drains at time of discharge:   Lines/Drains/Airways     None                 Time spent on the discharge of patient: 30 minutes  Patient was seen and examined on the date of discharge and determined to be suitable for discharge.         Gopal Roy MD  Department of Hospital Medicine  [facility]

## 2020-03-01 NOTE — NURSING
Pt discharge teaching was implemented along with paperwork. D/c IV sites, catheter intact. Pt AAOx4 VS were stable. Pt verbalizes understanding and no questions were asked.

## 2020-03-01 NOTE — NURSING
End of shift bedside report given to NANCY Pop. Patient in no apparent distress.     12 hour chart check complete.

## 2020-03-01 NOTE — PLAN OF CARE
Problem: Adult Inpatient Plan of Care  Goal: Readiness for Transition of Care  Outcome: Ongoing, Progressing

## 2021-05-20 ENCOUNTER — HOSPITAL ENCOUNTER (EMERGENCY)
Facility: HOSPITAL | Age: 41
Discharge: HOME | End: 2021-05-20
Attending: EMERGENCY MEDICINE
Payer: MEDICAID

## 2021-05-20 VITALS
HEART RATE: 65 BPM | SYSTOLIC BLOOD PRESSURE: 162 MMHG | HEIGHT: 61 IN | OXYGEN SATURATION: 98 % | WEIGHT: 200 LBS | DIASTOLIC BLOOD PRESSURE: 80 MMHG | TEMPERATURE: 99 F | BODY MASS INDEX: 37.76 KG/M2 | RESPIRATION RATE: 20 BRPM

## 2021-05-20 DIAGNOSIS — M87.237: Primary | ICD-10-CM

## 2021-05-20 DIAGNOSIS — M25.531 RIGHT WRIST PAIN: ICD-10-CM

## 2021-05-20 LAB
B-HCG UR QL: NEGATIVE
CTP QC/QA: YES

## 2021-05-20 PROCEDURE — 81025 URINE PREGNANCY TEST: CPT | Performed by: NURSE PRACTITIONER

## 2021-05-20 PROCEDURE — 25000003 PHARM REV CODE 250: Performed by: NURSE PRACTITIONER

## 2021-05-20 PROCEDURE — 99283 EMERGENCY DEPT VISIT LOW MDM: CPT | Mod: 25

## 2021-05-20 RX ORDER — NAPROXEN 500 MG/1
500 TABLET ORAL
Status: COMPLETED | OUTPATIENT
Start: 2021-05-20 | End: 2021-05-20

## 2021-05-20 RX ORDER — CHOLECALCIFEROL (VITAMIN D3) 25 MCG
185 TABLET ORAL
COMMUNITY

## 2021-05-20 RX ORDER — NAPROXEN 500 MG/1
500 TABLET ORAL 2 TIMES DAILY PRN
Qty: 20 TABLET | Refills: 0 | Status: SHIPPED | OUTPATIENT
Start: 2021-05-20 | End: 2021-05-25

## 2021-05-20 RX ORDER — CALCITRIOL 0.5 UG/1
1 CAPSULE ORAL 2 TIMES DAILY
COMMUNITY
Start: 2021-04-09

## 2021-05-20 RX ADMIN — NAPROXEN 500 MG: 500 TABLET ORAL at 11:05

## 2022-08-04 ENCOUNTER — HOSPITAL ENCOUNTER (EMERGENCY)
Facility: HOSPITAL | Age: 42
Discharge: HOME | End: 2022-08-04
Attending: EMERGENCY MEDICINE
Payer: MEDICAID

## 2022-08-04 VITALS
TEMPERATURE: 98 F | WEIGHT: 230 LBS | BODY MASS INDEX: 43.43 KG/M2 | DIASTOLIC BLOOD PRESSURE: 94 MMHG | HEIGHT: 61 IN | OXYGEN SATURATION: 100 % | RESPIRATION RATE: 19 BRPM | HEART RATE: 79 BPM | SYSTOLIC BLOOD PRESSURE: 186 MMHG

## 2022-08-04 DIAGNOSIS — J04.0 LARYNGITIS: ICD-10-CM

## 2022-08-04 DIAGNOSIS — J02.9 PHARYNGITIS, UNSPECIFIED ETIOLOGY: Primary | ICD-10-CM

## 2022-08-04 PROBLEM — E10.9 DIABETES MELLITUS TYPE 1: Status: ACTIVE | Noted: 2022-08-04

## 2022-08-04 PROBLEM — I10 HYPERTENSION, ESSENTIAL: Status: ACTIVE | Noted: 2022-05-04

## 2022-08-04 LAB
B-HCG UR QL: NEGATIVE
CTP QC/QA: YES
MOLECULAR STREP A: NEGATIVE
SARS-COV-2 RDRP RESP QL NAA+PROBE: NEGATIVE

## 2022-08-04 PROCEDURE — 96372 THER/PROPH/DIAG INJ SC/IM: CPT | Performed by: PHYSICIAN ASSISTANT

## 2022-08-04 PROCEDURE — 81025 URINE PREGNANCY TEST: CPT | Performed by: PHYSICIAN ASSISTANT

## 2022-08-04 PROCEDURE — U0002 COVID-19 LAB TEST NON-CDC: HCPCS | Performed by: PHYSICIAN ASSISTANT

## 2022-08-04 PROCEDURE — 63600175 PHARM REV CODE 636 W HCPCS: Performed by: PHYSICIAN ASSISTANT

## 2022-08-04 PROCEDURE — 25000003 PHARM REV CODE 250: Performed by: PHYSICIAN ASSISTANT

## 2022-08-04 PROCEDURE — 99284 EMERGENCY DEPT VISIT MOD MDM: CPT | Mod: 25

## 2022-08-04 RX ORDER — MAG HYDROX/ALUMINUM HYD/SIMETH 200-200-20
30 SUSPENSION, ORAL (FINAL DOSE FORM) ORAL ONCE
Status: COMPLETED | OUTPATIENT
Start: 2022-08-04 | End: 2022-08-04

## 2022-08-04 RX ORDER — ACETAMINOPHEN 325 MG/1
650 TABLET ORAL
Status: COMPLETED | OUTPATIENT
Start: 2022-08-04 | End: 2022-08-04

## 2022-08-04 RX ORDER — DEXAMETHASONE SODIUM PHOSPHATE 4 MG/ML
8 INJECTION, SOLUTION INTRA-ARTICULAR; INTRALESIONAL; INTRAMUSCULAR; INTRAVENOUS; SOFT TISSUE
Status: COMPLETED | OUTPATIENT
Start: 2022-08-04 | End: 2022-08-04

## 2022-08-04 RX ORDER — LIDOCAINE HYDROCHLORIDE 20 MG/ML
15 SOLUTION OROPHARYNGEAL ONCE
Status: COMPLETED | OUTPATIENT
Start: 2022-08-04 | End: 2022-08-04

## 2022-08-04 RX ADMIN — DEXAMETHASONE SODIUM PHOSPHATE 8 MG: 4 INJECTION INTRA-ARTICULAR; INTRALESIONAL; INTRAMUSCULAR; INTRAVENOUS; SOFT TISSUE at 05:08

## 2022-08-04 RX ADMIN — LIDOCAINE HYDROCHLORIDE 15 ML: 20 SOLUTION ORAL; TOPICAL at 05:08

## 2022-08-04 RX ADMIN — ACETAMINOPHEN 650 MG: 325 TABLET ORAL at 04:08

## 2022-08-04 RX ADMIN — ALUMINUM HYDROXIDE, MAGNESIUM HYDROXIDE, AND SIMETHICONE 30 ML: 200; 200; 20 SUSPENSION ORAL at 05:08

## 2022-08-04 NOTE — ED PROVIDER NOTES
Encounter Date: 2022    SCRIBE #1 NOTE: I, Emigdio Hargrove, am scribing for, and in the presence of, Shaheed Gilbert PA-C.       History     Chief Complaint   Patient presents with    Sore Throat     Pt c/o sore throat x 2 days. Pt denies CP, SOB, N/V/D or being around anyone ill. Pt states she did not take BP meds this morning.      Callie Glover is a 42 y.o. female with a PMHx of HTN and DM who presents to the Emergency Department  for evaluation of a 2 day history of acute constant worsening sore throat. Patient reports her pain is worse with swallowing and speaking. She states she is losing her voice. Patient endorses 3 days of increased belching. She states her belching resolved following the use of gas-x. She is also complaining of some pain to her anterior cervical lymph nodes. Patient denies any cough, rhinorrhea, fevers, or other associated symptoms. Patient expresses concerns her symptoms may be related to drinking a jessie tea for the first time. She recalls experiencing a similar episode of symptoms 8 years ago that was attributed to a bacterial infection. Patient does not endorse the use of any fever reducing medications prior to arrival. She denies frequent use of NSAID's, caffeine, peppermint, or carbonated beverages.    The history is provided by the patient.     Review of patient's allergies indicates:   Allergen Reactions    Ciprofloxacin Hives     Past Medical History:   Diagnosis Date    Calcium deficiency     Diabetes mellitus type 1 2022    Hypertension, essential 2022    Obesity, Class II, BMI 35-39.9 2020     Past Surgical History:   Procedure Laterality Date    ABDOMINAL SURGERY      APPENDECTOMY       SECTION, CLASSIC      x 1     Family History   Problem Relation Age of Onset    Diabetes Father     Hypertension Father     Kidney disease Father     No Known Problems Mother     Heart disease Maternal Grandmother     Diabetes Maternal Grandmother      Diabetes Paternal Grandmother     Heart disease Paternal Grandmother      Social History     Tobacco Use    Smoking status: Never Smoker    Smokeless tobacco: Never Used   Substance Use Topics    Alcohol use: No    Drug use: No     Review of Systems   Constitutional: Negative for fever.   HENT: Positive for sore throat.    Eyes: Negative for pain.   Respiratory: Negative for shortness of breath.    Cardiovascular: Negative for chest pain.   Gastrointestinal: Negative for abdominal pain and nausea.   Genitourinary: Negative for dysuria.   Musculoskeletal: Negative for back pain.   Skin: Negative for rash.   Neurological: Negative for weakness.       Physical Exam     Initial Vitals [08/04/22 1606]   BP Pulse Resp Temp SpO2   (!) 186/94 79 19 98.3 °F (36.8 °C) 100 %      MAP       --         Physical Exam    Nursing note and vitals reviewed.  Constitutional: She appears well-developed and well-nourished. She is not diaphoretic. No distress.   HENT:   Head: Atraumatic.   Right Ear: External ear normal.   Left Ear: External ear normal.   Mouth/Throat: Oropharynx is clear and moist.   Uvula midline.  Mild hoarseness to voice without hot potato voice.  Tender palpable bilateral anterior cervical and submental adenopathy.  No trismus or drooling.   Eyes: Conjunctivae and EOM are normal.   Neck: No tracheal deviation present.   Normal range of motion.  Cardiovascular: Normal rate and regular rhythm.   Pulmonary/Chest: No accessory muscle usage or stridor. No tachypnea. No respiratory distress.   Musculoskeletal:         General: No edema. Normal range of motion.      Cervical back: Normal range of motion.     Neurological: She is alert and oriented to person, place, and time. She displays no tremor. She displays no seizure activity. Coordination and gait normal.   Skin: Skin is intact. Capillary refill takes less than 2 seconds. No rash noted. No erythema.         ED Course   Procedures  Labs Reviewed   POCT STREP  A MOLECULAR   POCT URINE PREGNANCY   SARS-COV-2 RDRP GENE    Narrative:     This test utilizes isothermal nucleic acid amplification   technology to detect the SARS-CoV-2 RdRp nucleic acid segment.   The analytical sensitivity (limit of detection) is 125 genome   equivalents/mL.   A POSITIVE result implies infection with the SARS-CoV-2 virus;   the patient is presumed to be contagious.     A NEGATIVE result means that SARS-CoV-2 nucleic acids are not   present above the limit of detection. A NEGATIVE result should be   treated as presumptive. It does not rule out the possibility of   COVID-19 and should not be the sole basis for treatment decisions.   If COVID-19 is strongly suspected based on clinical and exposure   history, re-testing using an alternate molecular assay should be   considered.   This test is only for use under the Food and Drug   Administration s Emergency Use Authorization (EUA).   Commercial kits are provided by [employer].   Performance characteristics of the EUA have been independently   verified by [facility] Department of   Pathology and Laboratory Medicine.   _________________________________________________________________   The authorized Fact Sheet for Healthcare Providers and the authorized Fact   Sheet for Patients of the ID NOW COVID-19 are available on the FDA   website:     https://www.fda.gov/media/919155/download  https://www.fda.gov/media/792131/download                 Imaging Results    None          Medications   acetaminophen tablet 650 mg (650 mg Oral Given 8/4/22 1639)   dexamethasone injection 8 mg (8 mg Intramuscular Given 8/4/22 1741)   aluminum-magnesium hydroxide-simethicone 200-200-20 mg/5 mL suspension 30 mL (30 mLs Oral Given 8/4/22 1742)     And   LIDOcaine HCl 2% oral solution 15 mL (15 mLs Oral Given 8/4/22 1742)     Medical Decision Making:   History:   Old Medical Records: I decided to obtain old medical records.  Clinical Tests:   Lab Tests:  Reviewed and Ordered  ED Management:  I am unsure of the etiology of this patient's symptoms, however, I do not believe they are of emergent/life threatening etiology at this time.  Could be very early/mild viral process or a soft vagina is?.  No evidence of abscess or deep space infection in the ED.  No systemic symptoms.  Rapid strep and COVID-19 negative.  No other classic viral symptoms at this time.  Tolerating p.o..  Advising follow-up with PCP. Strict return precautions discussed.  Agreeable to plan.          Scribe Attestation:   Scribe #1: I performed the above scribed service and the documentation accurately describes the services I performed. I attest to the accuracy of the note.                 Clinical Impression:   Final diagnoses:  [J02.9] Pharyngitis, unspecified etiology (Primary)  [J04.0] Laryngitis          ED Disposition Condition    Discharge Stable        ED Prescriptions     Medication Sig Dispense Start Date End Date Auth. Provider    benzocaine-menthoL 15-20 mg Lozg Follows directions on packaging 16 lozenge 8/4/2022  Shaheed Gilbert PA-C        Follow-up Information     Follow up With Specialties Details Why Contact Info    Maria De Jesus Darnell MD Pediatrics Schedule an appointment as soon as possible for a visit in 1 day For re-evaluation [address]  [address]LO[address]  [facility] [address]  [phone]      [facility] - Emergency Dept Emergency Medicine Go to  If symptoms worsen [address]  [facility] [address] [phone]         I, Shaheed Gilbert PA-C, personally performed the services described in this documentation. All medical record entries made by the scribe were at my direction and in my presence. I have reviewed the chart and agree that the record reflects my personal performance and is accurate and complete.       Shaheed Gilbert PA-C  08/04/22 2250

## 2022-08-04 NOTE — ED TRIAGE NOTES
Patient c/o sore throat for a few days now. Patient denies SOB, Fever and chills. Patient states that she fells irritation with swallowing

## 2022-08-04 NOTE — DISCHARGE INSTRUCTIONS

## 2022-08-04 NOTE — Clinical Note
"Callie "Callie" Adalberto was seen and treated in our emergency department on 8/4/2022.  She may return to work on 08/08/2022.       If you have any questions or concerns, please don't hesitate to call.      Shaheed Gilbert PA-C"

## 2023-08-25 ENCOUNTER — HOSPITAL ENCOUNTER (EMERGENCY)
Facility: HOSPITAL | Age: 43
Discharge: HOME | End: 2023-08-25
Attending: EMERGENCY MEDICINE
Payer: MEDICAID

## 2023-08-25 VITALS
RESPIRATION RATE: 18 BRPM | HEIGHT: 61 IN | TEMPERATURE: 99 F | HEART RATE: 71 BPM | SYSTOLIC BLOOD PRESSURE: 168 MMHG | WEIGHT: 210 LBS | OXYGEN SATURATION: 99 % | DIASTOLIC BLOOD PRESSURE: 85 MMHG | BODY MASS INDEX: 39.65 KG/M2

## 2023-08-25 DIAGNOSIS — M75.41 SHOULDER IMPINGEMENT SYNDROME, RIGHT: Primary | ICD-10-CM

## 2023-08-25 LAB
B-HCG UR QL: NEGATIVE
CTP QC/QA: YES

## 2023-08-25 PROCEDURE — 81025 URINE PREGNANCY TEST: CPT

## 2023-08-25 PROCEDURE — 96372 THER/PROPH/DIAG INJ SC/IM: CPT

## 2023-08-25 PROCEDURE — 25000003 PHARM REV CODE 250

## 2023-08-25 PROCEDURE — 99284 EMERGENCY DEPT VISIT MOD MDM: CPT

## 2023-08-25 PROCEDURE — 63600175 PHARM REV CODE 636 W HCPCS

## 2023-08-25 RX ORDER — ORPHENADRINE CITRATE 100 MG/1
100 TABLET, EXTENDED RELEASE ORAL
Status: COMPLETED | OUTPATIENT
Start: 2023-08-25 | End: 2023-08-25

## 2023-08-25 RX ORDER — ACETAMINOPHEN 500 MG
500 TABLET ORAL EVERY 6 HOURS PRN
Qty: 28 TABLET | Refills: 0 | Status: SHIPPED | OUTPATIENT
Start: 2023-08-25 | End: 2023-09-01

## 2023-08-25 RX ORDER — NAPROXEN 500 MG/1
500 TABLET ORAL 2 TIMES DAILY WITH MEALS
Qty: 10 TABLET | Refills: 0 | Status: SHIPPED | OUTPATIENT
Start: 2023-08-25 | End: 2023-08-30

## 2023-08-25 RX ORDER — TIZANIDINE 2 MG/1
2 TABLET ORAL EVERY 8 HOURS PRN
Qty: 15 TABLET | Refills: 0 | Status: SHIPPED | OUTPATIENT
Start: 2023-08-25

## 2023-08-25 RX ORDER — KETOROLAC TROMETHAMINE 30 MG/ML
30 INJECTION, SOLUTION INTRAMUSCULAR; INTRAVENOUS
Status: COMPLETED | OUTPATIENT
Start: 2023-08-25 | End: 2023-08-25

## 2023-08-25 RX ADMIN — ORPHENADRINE CITRATE 100 MG: 100 TABLET, EXTENDED RELEASE ORAL at 04:08

## 2023-08-25 RX ADMIN — KETOROLAC TROMETHAMINE 30 MG: 30 INJECTION, SOLUTION INTRAMUSCULAR; INTRAVENOUS at 04:08

## 2023-08-25 NOTE — DISCHARGE INSTRUCTIONS

## 2023-08-26 NOTE — ED PROVIDER NOTES
Encounter Date: 2023       History     Chief Complaint   Patient presents with    Arm Injury     Patient reports right arm /neck pain that has been ongoing for a week, states was taking muscle relaxer and helped some but will not go away. Denies fall or injury.      Callie Glover is a 43-year-old female with no pertinent past medical history presents to the emergency department with a chief complaint of right shoulder pain.  She denies any injury that may have triggered her shoulder pain such as a fall, change in activity, or motor vehicle collision.  She reports that she was just had slow onset of right shoulder pain over the last week.  She took some muscle relaxers that helped but she ran out of this medication and now her pain has returned.  She was still able to move the shoulder but has a slightly decreased range of motion due to discomfort.    The history is provided by the patient. No  was used.     Review of patient's allergies indicates:   Allergen Reactions    Ciprofloxacin Hives     Past Medical History:   Diagnosis Date    Calcium deficiency     Diabetes mellitus type 1 2022    Hypertension, essential 2022    Obesity, Class II, BMI 35-39.9 2020     Past Surgical History:   Procedure Laterality Date    ABDOMINAL SURGERY      APPENDECTOMY       SECTION, CLASSIC      x 1     Family History   Problem Relation Age of Onset    Diabetes Father     Hypertension Father     Kidney disease Father     No Known Problems Mother     Heart disease Maternal Grandmother     Diabetes Maternal Grandmother     Diabetes Paternal Grandmother     Heart disease Paternal Grandmother      Social History     Tobacco Use    Smoking status: Never    Smokeless tobacco: Never   Substance Use Topics    Alcohol use: No    Drug use: No     Review of Systems   Constitutional:  Negative for chills and fever.   HENT:  Negative for sore throat.    Respiratory:  Negative for shortness of breath.     Cardiovascular:  Negative for chest pain.   Gastrointestinal:  Negative for nausea.   Genitourinary:  Negative for dysuria.   Musculoskeletal:  Positive for arthralgias (Right shoulder). Negative for back pain.   Skin:  Negative for rash.   Neurological:  Negative for weakness.   Hematological:  Does not bruise/bleed easily.       Physical Exam     Initial Vitals [08/25/23 1638]   BP Pulse Resp Temp SpO2   (!) 146/97 92 18 98.1 °F (36.7 °C) 100 %      MAP       --         Physical Exam    Nursing note and vitals reviewed.  Constitutional: Vital signs are normal. She appears well-developed and well-nourished. She is cooperative. She does not appear ill. No distress.   Appears mildly uncomfortable but is in no acute distress.  Not ill or toxic appearing.   HENT:   Head: Normocephalic and atraumatic.   Right Ear: Hearing and external ear normal.   Left Ear: Hearing and external ear normal.   Nose: Nose normal.   Eyes: Conjunctivae and EOM are normal.   Neck: Phonation normal.   Normal range of motion.  Cardiovascular:  Normal rate and regular rhythm.           No murmur heard.  Pulmonary/Chest: Effort normal. No respiratory distress.   Abdominal: Abdomen is soft. She exhibits no distension. There is no abdominal tenderness.   Musculoskeletal:      Right shoulder: Tenderness present. No deformity.      Left shoulder: Normal.      Cervical back: Normal range of motion.      Comments: Decreased range of motion on internal rotation and abduction of the right shoulder.  Tenderness to palpation over the superolateral aspect of the right shoulder.  Flexion and extension within normal limits.  Neurovascularly intact distal to this injury, strength and sensation symmetrical bilaterally in upper extremities.     Neurological: She is alert and oriented to person, place, and time. GCS eye subscore is 4. GCS verbal subscore is 5. GCS motor subscore is 6.   Skin: Skin is warm. Capillary refill takes less than 2 seconds.          ED Course   Procedures  Labs Reviewed   POCT URINE PREGNANCY          Imaging Results    None          Medications   ketorolac injection 30 mg (30 mg Intramuscular Given 8/25/23 1658)   orphenadrine 12 hr tablet 100 mg (100 mg Oral Given 8/25/23 1657)     Medical Decision Making  43-year-old female presenting to the emergency department with a chief complaint of atraumatic right shoulder pain. Pain has been present for a week and will not resolve.  She has not changed her activity to attempt to rest.  She has been taking muscle relaxers with moderate relief.  On physical exam, range of motion is mildly diminished in the right shoulder due to discomfort.  Tenderness to palpation over the superolateral aspect of the shoulder.    Differential diagnosis includes but is not limited to shoulder impingement syndrome, adhesive capsulitis, tendonitis, rotator cuff injury, contusion, strain, sprain, fracture, dislocation, or ligamentous injury of the affected shoulder.     Presentation is consistent with shoulder impingement syndrome.  I discussed with the patient that she needs to rest the shoulder to allow it some time to heal.  Discussed conservative management including ice, rest, and nonsteroidal anti-inflammatory medications.  Ice applied, patient placed in his sling.  Naproxen, Tylenol, and Zanaflex electronically prescribed and sent to her preferred pharmacy.  Patient was referred to Physical therapy and Orthopedics for further follow up.    Return precautions were discussed, all patient questions were answered, and the patient was agreeable to the plan of care.  She was discharged home in stable condition and will follow up with her primary care provider or return to the emergency department if her symptoms worsen or do not improve.     Amount and/or Complexity of Data Reviewed  Labs: ordered.    Risk  OTC drugs.  Prescription drug management.                               Clinical Impression:   Final  diagnoses:  [M75.41] Shoulder impingement syndrome, right (Primary)        ED Disposition Condition    Discharge Stable          ED Prescriptions       Medication Sig Dispense Start Date End Date Auth. Provider    tiZANidine (ZANAFLEX) 2 MG tablet Take 1 tablet (2 mg total) by mouth every 8 (eight) hours as needed (Muscle spasm). 15 tablet 8/25/2023 -- Les Jones, ABDIEL    acetaminophen (TYLENOL) 500 MG tablet Take 1 tablet (500 mg total) by mouth every 6 (six) hours as needed. 28 tablet 8/25/2023 9/1/2023 Les Jones, ABDIEL    naproxen (NAPROSYN) 500 MG tablet Take 1 tablet (500 mg total) by mouth 2 (two) times daily with meals. for 5 days 10 tablet 8/25/2023 8/30/2023 Les Jones PA-C          Follow-up Information       Follow up With Specialties Details Why Contact Info    Maria De Jesus Darnell MD Pediatrics Schedule an appointment as soon as possible for a visit  As needed, If symptoms worsen [address] [facility]  [address] FLO[address]  [facility] [address]  [phone]      [facility] - Emergency Dept Emergency Medicine Go to  If symptoms worsen [address] [facility] [address] [phone]             Les Jones, ABDIEL  08/25/23 2286

## 2024-03-08 ENCOUNTER — HOSPITAL ENCOUNTER (EMERGENCY)
Facility: HOSPITAL | Age: 44
Discharge: HOME | End: 2024-03-08
Attending: EMERGENCY MEDICINE
Payer: MEDICAID

## 2024-03-08 VITALS
OXYGEN SATURATION: 98 % | TEMPERATURE: 98 F | DIASTOLIC BLOOD PRESSURE: 77 MMHG | HEART RATE: 81 BPM | BODY MASS INDEX: 38.17 KG/M2 | RESPIRATION RATE: 18 BRPM | WEIGHT: 202 LBS | SYSTOLIC BLOOD PRESSURE: 147 MMHG

## 2024-03-08 DIAGNOSIS — I10 HTN (HYPERTENSION): ICD-10-CM

## 2024-03-08 DIAGNOSIS — M62.838 TRAPEZIUS MUSCLE SPASM: ICD-10-CM

## 2024-03-08 DIAGNOSIS — M25.512 ACUTE PAIN OF LEFT SHOULDER: Primary | ICD-10-CM

## 2024-03-08 LAB
ALBUMIN SERPL BCP-MCNC: 3.5 G/DL (ref 3.5–5.2)
ALP SERPL-CCNC: 59 U/L (ref 55–135)
ALT SERPL W/O P-5'-P-CCNC: 16 U/L (ref 10–44)
ANION GAP SERPL CALC-SCNC: 7 MMOL/L (ref 8–16)
AST SERPL-CCNC: 17 U/L (ref 10–40)
B-HCG UR QL: NEGATIVE
BASOPHILS # BLD AUTO: 0.01 K/UL (ref 0–0.2)
BASOPHILS NFR BLD: 0.1 % (ref 0–1.9)
BILIRUB SERPL-MCNC: 0.3 MG/DL (ref 0.1–1)
BNP SERPL-MCNC: 50 PG/ML (ref 0–99)
BUN SERPL-MCNC: 10 MG/DL (ref 6–20)
CALCIUM SERPL-MCNC: 7.8 MG/DL (ref 8.7–10.5)
CHLORIDE SERPL-SCNC: 102 MMOL/L (ref 95–110)
CO2 SERPL-SCNC: 29 MMOL/L (ref 23–29)
CREAT SERPL-MCNC: 0.8 MG/DL (ref 0.5–1.4)
CRP SERPL-MCNC: 25.2 MG/L (ref 0–8.2)
CTP QC/QA: YES
DIFFERENTIAL METHOD BLD: ABNORMAL
EOSINOPHIL # BLD AUTO: 0.1 K/UL (ref 0–0.5)
EOSINOPHIL NFR BLD: 1 % (ref 0–8)
ERYTHROCYTE [DISTWIDTH] IN BLOOD BY AUTOMATED COUNT: 13.9 % (ref 11.5–14.5)
ERYTHROCYTE [SEDIMENTATION RATE] IN BLOOD BY WESTERGREN METHOD: 30 MM/HR (ref 0–20)
EST. GFR  (NO RACE VARIABLE): >60 ML/MIN/1.73 M^2
GLUCOSE SERPL-MCNC: 127 MG/DL (ref 70–110)
HCT VFR BLD AUTO: 39.7 % (ref 37–48.5)
HGB BLD-MCNC: 13.4 G/DL (ref 12–16)
IMM GRANULOCYTES # BLD AUTO: 0.02 K/UL (ref 0–0.04)
IMM GRANULOCYTES NFR BLD AUTO: 0.3 % (ref 0–0.5)
LYMPHOCYTES # BLD AUTO: 1.3 K/UL (ref 1–4.8)
LYMPHOCYTES NFR BLD: 16.5 % (ref 18–48)
MCH RBC QN AUTO: 30.8 PG (ref 27–31)
MCHC RBC AUTO-ENTMCNC: 33.8 G/DL (ref 32–36)
MCV RBC AUTO: 91 FL (ref 82–98)
MONOCYTES # BLD AUTO: 0.5 K/UL (ref 0.3–1)
MONOCYTES NFR BLD: 6 % (ref 4–15)
NEUTROPHILS # BLD AUTO: 5.9 K/UL (ref 1.8–7.7)
NEUTROPHILS NFR BLD: 76.1 % (ref 38–73)
NRBC BLD-RTO: 0 /100 WBC
PLATELET # BLD AUTO: 235 K/UL (ref 150–450)
PMV BLD AUTO: 11.1 FL (ref 9.2–12.9)
POTASSIUM SERPL-SCNC: 3.6 MMOL/L (ref 3.5–5.1)
PROT SERPL-MCNC: 7.8 G/DL (ref 6–8.4)
RBC # BLD AUTO: 4.35 M/UL (ref 4–5.4)
SODIUM SERPL-SCNC: 138 MMOL/L (ref 136–145)
TROPONIN I SERPL DL<=0.01 NG/ML-MCNC: <0.006 NG/ML (ref 0–0.03)
WBC # BLD AUTO: 7.8 K/UL (ref 3.9–12.7)

## 2024-03-08 PROCEDURE — 84484 ASSAY OF TROPONIN QUANT: CPT | Performed by: PHYSICIAN ASSISTANT

## 2024-03-08 PROCEDURE — 25000003 PHARM REV CODE 250: Performed by: PHYSICIAN ASSISTANT

## 2024-03-08 PROCEDURE — 99285 EMERGENCY DEPT VISIT HI MDM: CPT | Mod: 25

## 2024-03-08 PROCEDURE — 96361 HYDRATE IV INFUSION ADD-ON: CPT

## 2024-03-08 PROCEDURE — 93005 ELECTROCARDIOGRAM TRACING: CPT

## 2024-03-08 PROCEDURE — 93010 ELECTROCARDIOGRAM REPORT: CPT | Mod: ,,, | Performed by: INTERNAL MEDICINE

## 2024-03-08 PROCEDURE — 63600175 PHARM REV CODE 636 W HCPCS: Performed by: PHYSICIAN ASSISTANT

## 2024-03-08 PROCEDURE — 85025 COMPLETE CBC W/AUTO DIFF WBC: CPT | Performed by: PHYSICIAN ASSISTANT

## 2024-03-08 PROCEDURE — 85652 RBC SED RATE AUTOMATED: CPT | Performed by: PHYSICIAN ASSISTANT

## 2024-03-08 PROCEDURE — 96375 TX/PRO/DX INJ NEW DRUG ADDON: CPT

## 2024-03-08 PROCEDURE — 80053 COMPREHEN METABOLIC PANEL: CPT | Performed by: PHYSICIAN ASSISTANT

## 2024-03-08 PROCEDURE — 96374 THER/PROPH/DIAG INJ IV PUSH: CPT

## 2024-03-08 PROCEDURE — 83880 ASSAY OF NATRIURETIC PEPTIDE: CPT | Performed by: PHYSICIAN ASSISTANT

## 2024-03-08 PROCEDURE — 86140 C-REACTIVE PROTEIN: CPT | Performed by: PHYSICIAN ASSISTANT

## 2024-03-08 PROCEDURE — 81025 URINE PREGNANCY TEST: CPT | Performed by: PHYSICIAN ASSISTANT

## 2024-03-08 PROCEDURE — 96372 THER/PROPH/DIAG INJ SC/IM: CPT | Mod: 59 | Performed by: PHYSICIAN ASSISTANT

## 2024-03-08 RX ORDER — CYCLOBENZAPRINE HCL 5 MG
10 TABLET ORAL 3 TIMES DAILY PRN
Qty: 15 TABLET | Refills: 0 | Status: SHIPPED | OUTPATIENT
Start: 2024-03-08 | End: 2024-03-13

## 2024-03-08 RX ORDER — MORPHINE SULFATE 4 MG/ML
4 INJECTION, SOLUTION INTRAMUSCULAR; INTRAVENOUS
Status: COMPLETED | OUTPATIENT
Start: 2024-03-08 | End: 2024-03-08

## 2024-03-08 RX ORDER — ORPHENADRINE CITRATE 100 MG/1
100 TABLET, EXTENDED RELEASE ORAL ONCE
Status: COMPLETED | OUTPATIENT
Start: 2024-03-08 | End: 2024-03-08

## 2024-03-08 RX ORDER — HYDROCODONE BITARTRATE AND ACETAMINOPHEN 5; 325 MG/1; MG/1
1 TABLET ORAL EVERY 6 HOURS PRN
Qty: 12 TABLET | Refills: 0 | Status: SHIPPED | OUTPATIENT
Start: 2024-03-08 | End: 2024-03-11

## 2024-03-08 RX ORDER — ONDANSETRON HYDROCHLORIDE 2 MG/ML
4 INJECTION, SOLUTION INTRAVENOUS
Status: COMPLETED | OUTPATIENT
Start: 2024-03-08 | End: 2024-03-08

## 2024-03-08 RX ORDER — KETOROLAC TROMETHAMINE 30 MG/ML
30 INJECTION, SOLUTION INTRAMUSCULAR; INTRAVENOUS
Status: COMPLETED | OUTPATIENT
Start: 2024-03-08 | End: 2024-03-08

## 2024-03-08 RX ORDER — MELOXICAM 7.5 MG/1
7.5 TABLET ORAL DAILY
Qty: 15 TABLET | Refills: 0 | Status: SHIPPED | OUTPATIENT
Start: 2024-03-08

## 2024-03-08 RX ADMIN — ONDANSETRON 4 MG: 2 INJECTION INTRAMUSCULAR; INTRAVENOUS at 10:03

## 2024-03-08 RX ADMIN — KETOROLAC TROMETHAMINE 30 MG: 30 INJECTION, SOLUTION INTRAMUSCULAR at 09:03

## 2024-03-08 RX ADMIN — ORPHENADRINE CITRATE 100 MG: 100 TABLET, EXTENDED RELEASE ORAL at 09:03

## 2024-03-08 RX ADMIN — MORPHINE SULFATE 4 MG: 4 INJECTION, SOLUTION INTRAMUSCULAR; INTRAVENOUS at 10:03

## 2024-03-08 RX ADMIN — SODIUM CHLORIDE 500 ML: 9 INJECTION, SOLUTION INTRAVENOUS at 10:03

## 2024-03-08 NOTE — DISCHARGE INSTRUCTIONS
Thank you for coming to our Emergency Department today. It is important to remember that some problems or medical conditions are difficult to diagnose and may not be found or addressed during your Emergency Department visit.  These conditions often start with non-specific symptoms and can only be diagnosed on follow up visits with your primary care physician or specialist when the symptoms continue or change. Please remember that all medical conditions can change, and we cannot predict how you will be feeling tomorrow or the next day. Return to the ER with any questions/concerns, new/concerning symptoms, worsening or failure to improve.       Be sure to follow up with your primary care doctor and review all labs/imaging/tests that were performed during your ER visit with them. It is very common for us to identify non-emergent incidental findings which must be followed up with your primary care physician.  Some labs/imaging/tests may be outside of the normal range, and require non-emergent follow-up and/or further investigation/treatment/procedures/testing to help diagnose/exclude/prevent complications or other potentially serious medical conditions. Some abnormalities may not have been discussed or addressed during your ER visit.     An ER visit does not replace a primary care visit, and many screening tests or follow-up tests cannot be ordered by an ER doctor or performed by the ER. Some tests may even require pre-approval.    If you do not have a primary care doctor, you may contact the one listed on your discharge paperwork or you may also call the [facility] Appointment Desk at [phone] , or [address] [place] [address] at  [phone] to schedule an appointment, or establish care with a primary care doctor or even a specialist and to obtain information about local resources. It is important to your health that you have a primary care doctor.    Please take all medications as directed. We have done our best to select  a medication for you that will treat your condition however, all medications may potentially have side-effects and it is impossible to predict which medications may give you side-effects or what those side-effects (if any) those medications may give you.  If you feel that you are having a negative effect or side-effect of any medication you should stop taking those medications immediately and seek medical attention. If you feel that you are having a life-threatening reaction call 911.        Do not drive, swim, climb to height, take a bath, operate heavy machinery, drink alcohol or take potentially sedating medications, sign any legal documents or make any important decisions for 24 hours if you have received any pain medications, sedatives or mood altering drugs during your ER visit or within 24 hours of taking them if they have been prescribed to you.     You can find additional resources for Dentists, hearing aids, durable medical equipment, low cost pharmacies and other resources at [email or web address]

## 2024-03-08 NOTE — Clinical Note
"                        Callie "Callie" Adalberto was seen and treated in our emergency department on 3/8/2024.  She may return to work on 03/11/2024.  ?     If you have any questions or concerns, please don't hesitate to call.      Shaheed Gilbert PA-C"

## 2024-03-08 NOTE — ED PROVIDER NOTES
Encounter Date: 3/8/2024    SCRIBE #1 NOTE: I, Nancy Monterroso, am scribing for, and in the presence of,  Shaheed Gilbert PA-C. I have scribed the following portions of the note - Other sections scribed: HPi,ROS.       History     Chief Complaint   Patient presents with    Shoulder Pain     PT reports pain from L shoulder to finger tips since Monday. Reports pain worsens with movement. Denies injury or trauma.      This is a 44 y.o. female, with a PMHx of Type I DM, HTN, heart problems, who presents to the ED with complaint of L side shoulder pain that worsenes with movement beginning 4 days ago. Patient reports additional symptom of L sided chest pain. Patient denies any recent falls, trauma, or injury. 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 No other exacerbating or alleviating factors. Denies fever, nausea, vomiting, dyspnea, night sweats, or other associated symptoms. Pt notes drug allergy to ciprofloxacin.       The history is provided by the patient. No  was used.     Review of patient's allergies indicates:   Allergen Reactions    Ciprofloxacin Hives     Past Medical History:   Diagnosis Date    Calcium deficiency     Diabetes mellitus type 1 2022    Hypertension, essential 2022    Obesity, Class II, BMI 35-39.9 2020     Past Surgical History:   Procedure Laterality Date    ABDOMINAL SURGERY      APPENDECTOMY       SECTION, CLASSIC      x 1     Family History   Problem Relation Age of Onset    Diabetes Father     Hypertension Father     Kidney disease Father     No Known Problems Mother     Heart disease Maternal Grandmother     Diabetes Maternal Grandmother     Diabetes Paternal Grandmother     Heart disease Paternal Grandmother      Social History     Tobacco Use    Smoking status: Never    Smokeless tobacco: Never   Substance Use Topics    Alcohol use: No    Drug use: No     Review of Systems   Constitutional:  Negative for fever.         - Nightly sweating    HENT:  Negative for congestion, sore throat and trouble swallowing.    Respiratory:  Negative for cough and shortness of breath.    Cardiovascular:  Positive for chest pain (left sided).   Gastrointestinal:  Negative for abdominal pain, constipation, diarrhea, nausea and vomiting.   Genitourinary:  Negative for dysuria, flank pain, frequency and urgency.   Musculoskeletal:  Negative for back pain.        + L sided shoulder pain   - recent trauma/injury    Skin:  Negative for rash.   Neurological:  Negative for headaches.   All other systems reviewed and are negative.      Physical Exam     Initial Vitals [03/08/24 0832]   BP Pulse Resp Temp SpO2   (!) 197/91 102 20 98.7 °F (37.1 °C) 100 %      MAP       --         Physical Exam    Nursing note and vitals reviewed.  Constitutional: She appears well-developed and well-nourished. She is not diaphoretic. No distress.   HENT:   Head: Atraumatic.   Right Ear: External ear normal.   Left Ear: External ear normal.   Eyes: Conjunctivae and EOM are normal.   Neck: No tracheal deviation present.   Normal range of motion.  Cardiovascular:  Normal rate and regular rhythm.           Pulmonary/Chest: No accessory muscle usage or stridor. No tachypnea. No respiratory distress.   Musculoskeletal:      Cervical back: Normal range of motion.      Comments: Very limited ROM of shoulder due to pain.  Full ROM to remainder of extremity.  No C-spine tenderness.  Very firm spasming of the left trapezius musculature.  No overlying skin abnormalities.  Equal  strength.  Radial pulses 2+ and equal.     Neurological: She is alert and oriented to person, place, and time. She displays no tremor. She displays no seizure activity. Coordination and gait normal.   Skin: Skin is intact. Capillary refill takes less than 2 seconds. No rash noted. No erythema.         ED Course   Procedures  Labs Reviewed   CBC W/ AUTO DIFFERENTIAL - Abnormal; Notable for the following  components:       Result Value    Gran % 76.1 (*)     Lymph % 16.5 (*)     All other components within normal limits   COMPREHENSIVE METABOLIC PANEL - Abnormal; Notable for the following components:    Glucose 127 (*)     Calcium 7.8 (*)     Anion Gap 7 (*)     All other components within normal limits   C-REACTIVE PROTEIN - Abnormal; Notable for the following components:    CRP 25.2 (*)     All other components within normal limits   SEDIMENTATION RATE - Abnormal; Notable for the following components:    Sed Rate 30 (*)     All other components within normal limits   TROPONIN I   B-TYPE NATRIURETIC PEPTIDE   POCT URINE PREGNANCY     EKG Readings: (Independently Interpreted)   Initial Reading: No STEMI. Rhythm: Sinus Tachycardia. Heart Rate: 106. Axis: Normal.   ST and T-wave changes to the inferior and anterior leads.       Imaging Results              X-Ray Shoulder 2 or More Views Left (Final result)  Result time 03/08/24 10:29:36      Final result by Den Bishop MD (03/08/24 10:29:36)                   Impression:      No convincing evidence of acute fracture or dislocation.      Electronically signed by: Den Bishop  Date:    03/08/2024  Time:    10:29               Narrative:    EXAMINATION:  XR SHOULDER COMPLETE 2 OR MORE VIEWS LEFT    CLINICAL HISTORY:  L shoulder pain;    TECHNIQUE:  Two or three views of the left shoulder were performed.    COMPARISON:  None    FINDINGS:  No definite evidence of acute fracture or dislocation.  Joint spaces appear maintained.    No acute findings the visualized portions the chest.    No evidence of radiopaque foreign body.                                       X-Ray Chest AP Portable (Final result)  Result time 03/08/24 10:28:40      Final result by Den Bishop MD (03/08/24 10:28:40)                   Impression:      No convincing evidence of acute cardiopulmonary disease.      Electronically signed by: Den Bishop  Date:    03/08/2024  Time:    10:28                Narrative:    EXAMINATION:  XR CHEST AP PORTABLE    CLINICAL HISTORY:  Chest Pain;    TECHNIQUE:  Single frontal view of the chest was performed.    COMPARISON:  Chest radiograph performed 04/03/2016.    FINDINGS:  Grossly unchanged cardiomediastinal contours.    Lungs clear.  No definite pneumothorax or large volume pleural effusion.    No acute findings in the visualized abdomen.    Osseous and soft tissue structures appear without definite acute abnormality.                                       Medications   ketorolac injection 30 mg (30 mg Intramuscular Given 3/8/24 0901)   orphenadrine 12 hr tablet 100 mg (100 mg Oral Given 3/8/24 0901)   sodium chloride 0.9% bolus 500 mL 500 mL (500 mLs Intravenous New Bag 3/8/24 1009)   morphine injection 4 mg (4 mg Intravenous Given 3/8/24 1033)   ondansetron injection 4 mg (4 mg Intravenous Given 3/8/24 1033)     Medical Decision Making  Presentation most consistent with musculoskeletal pain.  Feeling much better in the ED after IV medication and canal range her shoulder normally.  Low risk for septic joint today.  No radicular symptoms.  Atraumatic and screening x-rays show no bony instability.  No ischemic limb or deficit.  Low risk for occult cardiopulmonary process today.    Amount and/or Complexity of Data Reviewed  Labs: ordered.  Radiology: ordered.    Risk  Prescription drug management.            Scribe Attestation:   Scribe #1: I performed the above scribed service and the documentation accurately describes the services I performed. I attest to the accuracy of the note.                             I, Shaheed Gilbert PA-C, personally performed the services described in this documentation. All medical record entries made by the scribe were at my direction and in my presence. I have reviewed the chart and agree that the record reflects my personal performance and is accurate and complete.    Clinical Impression:  Final diagnoses:  [I10] HTN  (hypertension)  [M25.512] Acute pain of left shoulder (Primary)  [M62.838] Trapezius muscle spasm          ED Disposition Condition    Discharge Stable          ED Prescriptions       Medication Sig Dispense Start Date End Date Auth. Provider    meloxicam (MOBIC) 7.5 MG tablet Take 1 tablet (7.5 mg total) by mouth once daily. 15 tablet 3/8/2024 -- Shaheed Gilbert PA-C    cyclobenzaprine (FLEXERIL) 5 MG tablet Take 2 tablets (10 mg total) by mouth 3 (three) times daily as needed for Muscle spasms. 15 tablet 3/8/2024 3/13/2024 Shaheed Gilbert PA-C    HYDROcodone-acetaminophen (NORCO) 5-325 mg per tablet Take 1 tablet by mouth every 6 (six) hours as needed for Pain. 12 tablet 3/8/2024 3/11/2024 Shaheed Gilbert PA-C          Follow-up Information       Follow up With Specialties Details Why Contact Info    Maria De Jesus Darnell MD Pediatrics Schedule an appointment as soon as possible for a visit in 1 day For re-evaluation [address]  [address]LO[address]  [facility] [address]  [phone]      [facility] Orthopedics Schedule an appointment as soon as possible for a visit in 1 day For further evaluation of your orthopedic injury [address] [facility] [place] [address] [phone]    [facility] - Emergency Dept Emergency Medicine Go to  If symptoms worsen or new symptoms develop [address] [facility] [place] [address] [phone]             Shaheed Gilbert PA-C  03/08/24 1300

## 2024-03-11 LAB
OHS QRS DURATION: 70 MS
OHS QTC CALCULATION: 438 MS